# Patient Record
Sex: FEMALE | Race: BLACK OR AFRICAN AMERICAN | Employment: OTHER | URBAN - METROPOLITAN AREA
[De-identification: names, ages, dates, MRNs, and addresses within clinical notes are randomized per-mention and may not be internally consistent; named-entity substitution may affect disease eponyms.]

---

## 2017-03-04 ENCOUNTER — HOSPITAL ENCOUNTER (EMERGENCY)
Age: 62
Discharge: HOME | End: 2017-03-04
Attending: EMERGENCY MEDICINE
Payer: MEDICAID

## 2017-03-04 ENCOUNTER — APPOINTMENT (OUTPATIENT)
Dept: GENERAL RADIOLOGY | Age: 62
End: 2017-03-04
Attending: EMERGENCY MEDICINE
Payer: MEDICAID

## 2017-03-04 VITALS
BODY MASS INDEX: 22.44 KG/M2 | RESPIRATION RATE: 16 BRPM | HEART RATE: 59 BPM | OXYGEN SATURATION: 99 % | DIASTOLIC BLOOD PRESSURE: 82 MMHG | TEMPERATURE: 97.6 F | WEIGHT: 143 LBS | SYSTOLIC BLOOD PRESSURE: 174 MMHG | HEIGHT: 67 IN

## 2017-03-04 DIAGNOSIS — S92.504A CLOSED NONDISPLACED FRACTURE OF PHALANX OF LESSER TOE OF RIGHT FOOT, UNSPECIFIED PHALANX, INITIAL ENCOUNTER: ICD-10-CM

## 2017-03-04 DIAGNOSIS — G89.29 CHRONIC GENERALIZED ABDOMINAL PAIN: Primary | ICD-10-CM

## 2017-03-04 DIAGNOSIS — R10.84 CHRONIC GENERALIZED ABDOMINAL PAIN: Primary | ICD-10-CM

## 2017-03-04 LAB
ALBUMIN SERPL BCP-MCNC: 3.6 G/DL (ref 3.2–4.6)
ALBUMIN/GLOB SERPL: 0.7 {RATIO} (ref 1.2–3.5)
ALP SERPL-CCNC: 165 U/L (ref 50–136)
ALT SERPL-CCNC: 28 U/L (ref 12–65)
ANION GAP BLD CALC-SCNC: 6 MMOL/L (ref 7–16)
AST SERPL W P-5'-P-CCNC: 33 U/L (ref 15–37)
BACTERIA URNS QL MICRO: 0 /HPF
BASOPHILS # BLD AUTO: 0 K/UL (ref 0–0.2)
BASOPHILS # BLD: 0 % (ref 0–2)
BILIRUB SERPL-MCNC: 0.3 MG/DL (ref 0.2–1.1)
BUN SERPL-MCNC: 19 MG/DL (ref 8–23)
CALCIUM SERPL-MCNC: 9.5 MG/DL (ref 8.3–10.4)
CASTS URNS QL MICRO: NORMAL /LPF
CHLORIDE SERPL-SCNC: 108 MMOL/L (ref 98–107)
CO2 SERPL-SCNC: 29 MMOL/L (ref 21–32)
CREAT SERPL-MCNC: 1.65 MG/DL (ref 0.6–1)
DIFFERENTIAL METHOD BLD: ABNORMAL
EOSINOPHIL # BLD: 0.1 K/UL (ref 0–0.8)
EOSINOPHIL NFR BLD: 2 % (ref 0.5–7.8)
EPI CELLS #/AREA URNS HPF: NORMAL /HPF
ERYTHROCYTE [DISTWIDTH] IN BLOOD BY AUTOMATED COUNT: 16.6 % (ref 11.9–14.6)
GLOBULIN SER CALC-MCNC: 4.9 G/DL (ref 2.3–3.5)
GLUCOSE SERPL-MCNC: 145 MG/DL (ref 65–100)
HCT VFR BLD AUTO: 36.8 % (ref 35.8–46.3)
HGB BLD-MCNC: 11.5 G/DL (ref 11.7–15.4)
IMM GRANULOCYTES # BLD: 0 K/UL (ref 0–0.5)
IMM GRANULOCYTES NFR BLD AUTO: 0.2 % (ref 0–5)
LIPASE SERPL-CCNC: 128 U/L (ref 73–393)
LYMPHOCYTES # BLD AUTO: 33 % (ref 13–44)
LYMPHOCYTES # BLD: 2 K/UL (ref 0.5–4.6)
MAGNESIUM SERPL-MCNC: 2 MG/DL (ref 1.8–2.4)
MCH RBC QN AUTO: 22 PG (ref 26.1–32.9)
MCHC RBC AUTO-ENTMCNC: 31.3 G/DL (ref 31.4–35)
MCV RBC AUTO: 70.4 FL (ref 79.6–97.8)
MONOCYTES # BLD: 0.3 K/UL (ref 0.1–1.3)
MONOCYTES NFR BLD AUTO: 5 % (ref 4–12)
NEUTS SEG # BLD: 3.5 K/UL (ref 1.7–8.2)
NEUTS SEG NFR BLD AUTO: 60 % (ref 43–78)
PLATELET # BLD AUTO: 246 K/UL (ref 150–450)
PMV BLD AUTO: 9.5 FL (ref 10.8–14.1)
POTASSIUM SERPL-SCNC: 4.4 MMOL/L (ref 3.5–5.1)
PROT SERPL-MCNC: 8.5 G/DL (ref 6.3–8.2)
RBC # BLD AUTO: 5.23 M/UL (ref 4.05–5.25)
RBC #/AREA URNS HPF: 0 /HPF
SODIUM SERPL-SCNC: 143 MMOL/L (ref 136–145)
WBC # BLD AUTO: 5.9 K/UL (ref 4.3–11.1)
WBC URNS QL MICRO: NORMAL /HPF

## 2017-03-04 PROCEDURE — 81003 URINALYSIS AUTO W/O SCOPE: CPT | Performed by: EMERGENCY MEDICINE

## 2017-03-04 PROCEDURE — 96361 HYDRATE IV INFUSION ADD-ON: CPT | Performed by: EMERGENCY MEDICINE

## 2017-03-04 PROCEDURE — 81015 MICROSCOPIC EXAM OF URINE: CPT | Performed by: EMERGENCY MEDICINE

## 2017-03-04 PROCEDURE — 83735 ASSAY OF MAGNESIUM: CPT | Performed by: EMERGENCY MEDICINE

## 2017-03-04 PROCEDURE — 80053 COMPREHEN METABOLIC PANEL: CPT | Performed by: EMERGENCY MEDICINE

## 2017-03-04 PROCEDURE — 74011250636 HC RX REV CODE- 250/636: Performed by: EMERGENCY MEDICINE

## 2017-03-04 PROCEDURE — 96374 THER/PROPH/DIAG INJ IV PUSH: CPT | Performed by: EMERGENCY MEDICINE

## 2017-03-04 PROCEDURE — 99284 EMERGENCY DEPT VISIT MOD MDM: CPT | Performed by: EMERGENCY MEDICINE

## 2017-03-04 PROCEDURE — 74022 RADEX COMPL AQT ABD SERIES: CPT

## 2017-03-04 PROCEDURE — 83690 ASSAY OF LIPASE: CPT | Performed by: EMERGENCY MEDICINE

## 2017-03-04 PROCEDURE — 85025 COMPLETE CBC W/AUTO DIFF WBC: CPT | Performed by: EMERGENCY MEDICINE

## 2017-03-04 PROCEDURE — 74011250636 HC RX REV CODE- 250/636

## 2017-03-04 PROCEDURE — 96375 TX/PRO/DX INJ NEW DRUG ADDON: CPT | Performed by: EMERGENCY MEDICINE

## 2017-03-04 RX ORDER — ONDANSETRON 2 MG/ML
4 INJECTION INTRAMUSCULAR; INTRAVENOUS
Status: COMPLETED | OUTPATIENT
Start: 2017-03-04 | End: 2017-03-04

## 2017-03-04 RX ORDER — DIPHENHYDRAMINE HYDROCHLORIDE 50 MG/ML
25 INJECTION, SOLUTION INTRAMUSCULAR; INTRAVENOUS
Status: COMPLETED | OUTPATIENT
Start: 2017-03-04 | End: 2017-03-04

## 2017-03-04 RX ORDER — SUCRALFATE 1 G/10ML
1 SUSPENSION ORAL 4 TIMES DAILY
Qty: 414 ML | Refills: 1 | Status: SHIPPED | OUTPATIENT
Start: 2017-03-04 | End: 2022-04-28

## 2017-03-04 RX ORDER — DIPHENHYDRAMINE HYDROCHLORIDE 50 MG/ML
INJECTION, SOLUTION INTRAMUSCULAR; INTRAVENOUS
Status: COMPLETED
Start: 2017-03-04 | End: 2017-03-04

## 2017-03-04 RX ORDER — METOCLOPRAMIDE 5 MG/1
5 TABLET ORAL
Qty: 19 TAB | Refills: 0 | Status: SHIPPED | OUTPATIENT
Start: 2017-03-04 | End: 2017-03-14

## 2017-03-04 RX ADMIN — SODIUM CHLORIDE 1000 ML: 900 INJECTION, SOLUTION INTRAVENOUS at 17:33

## 2017-03-04 RX ADMIN — ONDANSETRON 4 MG: 2 INJECTION, SOLUTION INTRAMUSCULAR; INTRAVENOUS at 17:33

## 2017-03-04 RX ADMIN — DIPHENHYDRAMINE HYDROCHLORIDE 25 MG: 50 INJECTION, SOLUTION INTRAMUSCULAR; INTRAVENOUS at 17:45

## 2017-03-04 NOTE — ED TRIAGE NOTES
Reports continued right foot toe pain from a broken toe 4 weeks ago. Also reports abdominal pain that radiates into her back. States saw GI for it last month.

## 2017-03-04 NOTE — DISCHARGE INSTRUCTIONS
Abdominal Pain: Care Instructions  Your Care Instructions    Abdominal pain has many possible causes. Some aren't serious and get better on their own in a few days. Others need more testing and treatment. If your pain continues or gets worse, you need to be rechecked and may need more tests to find out what is wrong. You may need surgery to correct the problem. Don't ignore new symptoms, such as fever, nausea and vomiting, urination problems, pain that gets worse, and dizziness. These may be signs of a more serious problem. Your doctor may have recommended a follow-up visit in the next 8 to 12 hours. If you are not getting better, you may need more tests or treatment. The doctor has checked you carefully, but problems can develop later. If you notice any problems or new symptoms, get medical treatment right away. Follow-up care is a key part of your treatment and safety. Be sure to make and go to all appointments, and call your doctor if you are having problems. It's also a good idea to know your test results and keep a list of the medicines you take. How can you care for yourself at home? · Rest until you feel better. · To prevent dehydration, drink plenty of fluids, enough so that your urine is light yellow or clear like water. Choose water and other caffeine-free clear liquids until you feel better. If you have kidney, heart, or liver disease and have to limit fluids, talk with your doctor before you increase the amount of fluids you drink. · If your stomach is upset, eat mild foods, such as rice, dry toast or crackers, bananas, and applesauce. Try eating several small meals instead of two or three large ones. · Wait until 48 hours after all symptoms have gone away before you have spicy foods, alcohol, and drinks that contain caffeine. · Do not eat foods that are high in fat. · Avoid anti-inflammatory medicines such as aspirin, ibuprofen (Advil, Motrin), and naproxen (Aleve).  These can cause stomach upset. Talk to your doctor if you take daily aspirin for another health problem. When should you call for help? Call 911 anytime you think you may need emergency care. For example, call if:  · You passed out (lost consciousness). · You pass maroon or very bloody stools. · You vomit blood or what looks like coffee grounds. · You have new, severe belly pain. Call your doctor now or seek immediate medical care if:  · Your pain gets worse, especially if it becomes focused in one area of your belly. · You have a new or higher fever. · Your stools are black and look like tar, or they have streaks of blood. · You have unexpected vaginal bleeding. · You have symptoms of a urinary tract infection. These may include:  ¨ Pain when you urinate. ¨ Urinating more often than usual.  ¨ Blood in your urine. · You are dizzy or lightheaded, or you feel like you may faint. Watch closely for changes in your health, and be sure to contact your doctor if:  · You are not getting better after 1 day (24 hours). Where can you learn more? Go to http://brittany-mike.info/. Enter N176 in the search box to learn more about \"Abdominal Pain: Care Instructions. \"  Current as of: May 27, 2016  Content Version: 11.1  © 8765-3933 [employer]. Care instructions adapted under license by [employer] (which disclaims liability or warranty for this information). If you have questions about a medical condition or this instruction, always ask your healthcare professional. [place] [address] any warranty or liability for your use of this information. Broken Toe: Care Instructions  Your Care Instructions  You have broken (fractured) a bone in your toe. This kind of fracture does not need a special cast or brace. \"Gaurav-taping\" your broken toe to a healthy toe next to it is almost always enough to treat the problem and ease symptoms.  The toe may take 4 weeks or more to heal.  You heal best when you take good care of yourself. Eat a variety of healthy foods, and don't smoke. Follow-up care is a key part of your treatment and safety. Be sure to make and go to all appointments, and call your doctor if you are having problems. It's also a good idea to know your test results and keep a list of the medicines you take. How can you care for yourself at home? · Be safe with medicines. Take pain medicines exactly as directed. ¨ If the doctor gave you a prescription medicine for pain, take it as prescribed. ¨ If you are not taking a prescription pain medicine, ask your doctor if you can take an over-the-counter medicine. · If your toe is taped to the toe next to it, your doctor has shown you how to change the tape. Protect the skin by putting something soft, such as felt or foam, between your toes before you tape them together. Never tape the toes together skin-to-skin. Your broken toe may need to be laura-taped for 2 to 4 weeks to heal.  · Rest and protect your toe. Do not walk on it until you can do so without too much pain. If the doctor has told you to use crutches, use them as instructed. · Put ice or a cold pack on your toe for 10 to 20 minutes at a time. Try to do this every 1 to 2 hours for the next 3 days (when you are awake) or until the swelling goes down. Put a thin cloth between the ice and your skin. · Prop up your foot on a pillow when you ice it or anytime you sit or lie down. Try to keep it above the level of your heart. This will help reduce swelling. · Make sure you go to your follow-up appointments. Your doctor will need to check that your toe is healing right. When should you call for help? Call your doctor now or seek immediate medical care if:  · You have severe pain. · Your toe is cool or pale or changes color. · You have tingling, weakness, or numbness in your toe.   Watch closely for changes in your health, and be sure to contact your doctor if:  · Pain and swelling get worse or are not improving. · You have a new or worse deformity in your toe. Where can you learn more? Go to http://brittany-mike.info/. Enter S109 in the search box to learn more about \"Broken Toe: Care Instructions. \"  Current as of: May 23, 2016  Content Version: 11.1  © 7724-8183 [employer]. Care instructions adapted under license by [employer] (which disclaims liability or warranty for this information). If you have questions about a medical condition or this instruction, always ask your healthcare professional. [place] [address] any warranty or liability for your use of this information.

## 2017-03-04 NOTE — ED PROVIDER NOTES
HPI Comments: 26-year-old female presents with exacerbation of her chronic abdominal pain. Followed by her primary care doctor, as well as GI. No history of hepatitis C  Had ultrasound in the last 2 weeks which was essentially unremarkable. Patient also complains of pain in her right fourth toe. Apparently about 4 weeks of head injury was seen by her primary care doctor-facial fracture. She is placed in a walking shoe at that time. She states she has not had any follow-up since. She reports of the toes remained swollen and painful. Patient is a 57 y.o. female presenting with abdominal pain. The history is provided by the patient. Abdominal Pain    This is a recurrent problem. The current episode started more than 1 week ago. The problem occurs constantly. The problem has not changed since onset. The pain is associated with vomiting and laxative use. The pain is located in the generalized abdominal region. The pain is moderate. Associated symptoms include nausea, vomiting, constipation and arthralgias. Pertinent negatives include no fever, no diarrhea, no dysuria, no frequency, no hematuria, no headaches, no myalgias and no chest pain. The pain is worsened by eating and activity. The pain is relieved by nothing. Past workup includes CT scan, ultrasound. Past medical history comments: hepatitis C. Past Medical History:   Diagnosis Date    Arthritis     osteo    Cataract     left eye    Chronic kidney disease     renal calc.  Chronic pain     joint    Gastrointestinal disorder     ulcer    GERD (gastroesophageal reflux disease)     takes occassional OTC meds    Hypertension     controlled by meds    Kidney stones     Other ill-defined conditions(799.89)     \"has no sweat glands\", sickle  cell    sickle cell trait     last crisis 4/14, well controlled per pt.        Past Surgical History:   Procedure Laterality Date    HX CHOLECYSTECTOMY  2005    HX GI  [id number],[id number]    hiatal hernia x 2    HX HYSTERECTOMY  1999    MIGUEL ANGEL    HX OTHER SURGICAL      groin, buttocks, axilla- sweat glands    HX UROLOGICAL   2009x23/2010    cyto,stentx3         Family History:   Problem Relation Age of Onset    Diabetes Mother     Hypertension Mother     Hypertension Brother     Heart Disease Brother     Lung Disease Brother     Diabetes Brother     Heart Disease Sister     Hypertension Sister     Heart Disease Brother     Lung Disease Brother     Alcohol abuse Neg Hx     Allergy-severe Neg Hx     Anemia Neg Hx     Asthma Neg Hx     Cancer Neg Hx     Depression Neg Hx     Heart Attack Neg Hx     Kidney Disease Neg Hx     Mental Retardation Neg Hx     Rashes/Skin Problems Neg Hx     Malignant Hyperthermia Neg Hx     Pseudocholinesterase Deficiency Neg Hx     Delayed Awakening Neg Hx     Post-op Nausea/Vomiting Neg Hx     Emergence Delirium Neg Hx     Post-op Cognitive Dysfunction Neg Hx     Other Neg Hx        Social History     Social History    Marital status: SINGLE     Spouse name: N/A    Number of children: N/A    Years of education: N/A     Occupational History    Not on file. Social History Main Topics    Smoking status: Never Smoker    Smokeless tobacco: Never Used    Alcohol use No    Drug use: No    Sexual activity: Not Currently     Other Topics Concern    Not on file     Social History Narrative         ALLERGIES: Trazodone and Ultram [tramadol]    Review of Systems   Constitutional: Negative for chills and fever. HENT: Negative for congestion, ear pain and rhinorrhea. Eyes: Negative for photophobia and discharge. Respiratory: Negative for cough and shortness of breath. Cardiovascular: Negative for chest pain and palpitations. Gastrointestinal: Positive for abdominal pain, constipation, nausea and vomiting. Negative for diarrhea. Endocrine: Negative for cold intolerance and heat intolerance.    Genitourinary: Negative for dysuria, flank pain, frequency and hematuria. Musculoskeletal: Positive for arthralgias. Negative for myalgias and neck pain. Skin: Negative for rash and wound. Allergic/Immunologic: Negative for environmental allergies and food allergies. Neurological: Negative for syncope and headaches. Hematological: Negative for adenopathy. Does not bruise/bleed easily. Psychiatric/Behavioral: Positive for dysphoric mood. The patient is not nervous/anxious. All other systems reviewed and are negative. Vitals:    03/04/17 1640   BP: (!) 184/92   Pulse: 67   Resp: 18   Temp: 97.7 °F (36.5 °C)   SpO2: 100%   Weight: 64.9 kg (143 lb)   Height: 5' 7\" (1.702 m)            Physical Exam   Constitutional: She is oriented to person, place, and time. She appears well-developed and well-nourished. She appears distressed. HENT:   Head: Normocephalic and atraumatic. Mouth/Throat: Oropharynx is clear and moist.   Eyes: Conjunctivae and EOM are normal. Pupils are equal, round, and reactive to light. Right eye exhibits no discharge. Left eye exhibits no discharge. No scleral icterus. Neck: Normal range of motion. Neck supple. No thyromegaly present. Cardiovascular: Normal rate, regular rhythm, normal heart sounds and intact distal pulses. Exam reveals no gallop and no friction rub. No murmur heard. Pulmonary/Chest: Effort normal and breath sounds normal. No respiratory distress. She has no wheezes. She exhibits no tenderness. Abdominal: Soft. Bowel sounds are normal. She exhibits no distension. There is no hepatosplenomegaly. There is tenderness. There is no rebound and no guarding. No hernia. Diffuse mild tenderness to palpation. No mass. No percussive tenderness. Musculoskeletal: Normal range of motion. She exhibits no edema or tenderness. Feet:    Neurological: She is alert and oriented to person, place, and time. No cranial nerve deficit. She exhibits normal muscle tone. Skin: Skin is warm. No rash noted.  She is not diaphoretic. No erythema. Psychiatric: She has a normal mood and affect. Her behavior is normal. Judgment and thought content normal.   Nursing note and vitals reviewed. MDM  Number of Diagnoses or Management Options  Chronic generalized abdominal pain: established and worsening  Closed nondisplaced fracture of phalanx of lesser toe of right foot, unspecified phalanx, initial encounter: established and worsening  Diagnosis management comments: 1)  Recurrent abdominal pain =- likely related to her chronic hepatitis C. We will recheck lab work and some plain films as a patient as had a recent ultrasound which was unremarkable    2)  . Right fourth toe pain - patient is partially 4 weeks out from a known fracture. She is in an appropriate splint at this time. I will encourage her to follow-up with orthopedics. Given the fact she still having persistent pain. [facility] records are reviewed. The patient gets oxycodone 15 mg 1 4 times a day from her primary care provider as well as a benzodiazepine. Both prescriptions should be current. Amount and/or Complexity of Data Reviewed  Clinical lab tests: ordered and reviewed  Tests in the radiology section of CPT®: ordered and reviewed  Tests in the medicine section of CPT®: ordered and reviewed  Review and summarize past medical records: yes    Risk of Complications, Morbidity, and/or Mortality  Presenting problems: moderate  Diagnostic procedures: moderate  Management options: low  General comments: Elements of this note have been dictated via voice recognition software. Text and phrases may be limited by the accuracy of the software. The chart has been reviewed, but errors may still be present.       Patient Progress  Patient progress: stable    ED Course       Procedures

## 2017-03-05 NOTE — ED NOTES
I have reviewed discharge instructions with the patient. The patient verbalized understanding. Prescriptions given to pt. Patient denies any further needs, questions, or concerns at this time. No adverse reactions to any treatments, meds, or procedures noted. Pt ambulatory with steady gait to POV with spouse. Pt signed hard copy d/c form.

## 2021-07-20 ENCOUNTER — HOSPITAL ENCOUNTER (EMERGENCY)
Age: 66
Discharge: HOME | End: 2021-07-21
Attending: EMERGENCY MEDICINE
Payer: MEDICARE

## 2021-07-20 VITALS
DIASTOLIC BLOOD PRESSURE: 90 MMHG | TEMPERATURE: 98.8 F | SYSTOLIC BLOOD PRESSURE: 165 MMHG | RESPIRATION RATE: 16 BRPM | HEART RATE: 57 BPM | BODY MASS INDEX: 23.18 KG/M2 | OXYGEN SATURATION: 99 % | WEIGHT: 148 LBS

## 2021-07-20 DIAGNOSIS — N39.0 URINARY TRACT INFECTION WITHOUT HEMATURIA, SITE UNSPECIFIED: Primary | ICD-10-CM

## 2021-07-20 PROCEDURE — 93005 ELECTROCARDIOGRAM TRACING: CPT | Performed by: EMERGENCY MEDICINE

## 2021-07-20 PROCEDURE — 74011250637 HC RX REV CODE- 250/637: Performed by: PHYSICIAN ASSISTANT

## 2021-07-20 PROCEDURE — 81003 URINALYSIS AUTO W/O SCOPE: CPT

## 2021-07-20 PROCEDURE — 99285 EMERGENCY DEPT VISIT HI MDM: CPT

## 2021-07-20 RX ORDER — CEPHALEXIN 500 MG/1
500 CAPSULE ORAL 3 TIMES DAILY
Qty: 21 CAPSULE | Refills: 0 | Status: SHIPPED | OUTPATIENT
Start: 2021-07-20 | End: 2021-07-27

## 2021-07-20 RX ORDER — CEPHALEXIN 500 MG/1
500 CAPSULE ORAL
Status: COMPLETED | OUTPATIENT
Start: 2021-07-20 | End: 2021-07-20

## 2021-07-20 RX ADMIN — CEPHALEXIN 500 MG: 500 CAPSULE ORAL at 23:55

## 2021-07-21 LAB
ATRIAL RATE: 54 BPM
CALCULATED P AXIS, ECG09: 47 DEGREES
CALCULATED R AXIS, ECG10: 7 DEGREES
CALCULATED T AXIS, ECG11: 37 DEGREES
DIAGNOSIS, 93000: NORMAL
P-R INTERVAL, ECG05: 146 MS
Q-T INTERVAL, ECG07: 412 MS
QRS DURATION, ECG06: 76 MS
QTC CALCULATION (BEZET), ECG08: 390 MS
VENTRICULAR RATE, ECG03: 54 BPM

## 2021-07-21 NOTE — ED TRIAGE NOTES
Pt states she has been falling for the last 2 weeks and feeling bad not getting out of bed.  States she  Has not been eating

## 2021-07-21 NOTE — ED NOTES
I have reviewed discharge instructions with the patient. The patient verbalized understanding. Patient left ED via Discharge Method: ambulatory to Home with family/friend. Opportunity for questions and clarification provided. Patient given 1 script. To continue your aftercare when you leave the hospital, you may receive an automated call from our care team to check in on how you are doing. This is a free service and part of our promise to provide the best care and service to meet your aftercare needs.  If you have questions, or wish to unsubscribe from this service please call [phone]. Thank you for Choosing our [facility] Emergency Department.

## 2021-07-21 NOTE — ED PROVIDER NOTES
68-year-old female presents with symptoms of just not feeling like herself. She states that she feels tired and she has fallen several times in the past few weeks. She states that she doesn't really know what is wrong she was coming to the hospital to bring a friend to the hospital and she thought she get seen and checked out to. States that she doesn't want to have any blood drawn for her being such a hard stick. She denies any fever denies any nausea vomiting. She does state that she has have some chills from time to time. Denies any dysuria or vaginal discharge. Bladder Infection   This is a new problem. The problem has not changed since onset. The quality of the pain is described as aching. The pain is at a severity of 4/10. The pain is mild. There has been no fever. Associated symptoms include chills. Pertinent negatives include no sweats, no nausea, no vomiting, no discharge, no frequency, no hematuria, no hesitancy, no possible pregnancy and no urgency. She has tried nothing for the symptoms. Her past medical history does not include kidney stones or urological procedure. Past Medical History:   Diagnosis Date    Arthritis     osteo    Cataract     left eye    Chronic kidney disease     renal calc.  Chronic pain     joint    Gastrointestinal disorder     ulcer    GERD (gastroesophageal reflux disease)     takes occassional OTC meds    Hypertension     controlled by meds    Kidney stones     Other ill-defined conditions(799.89)     \"has no sweat glands\", sickle  cell    sickle cell trait     last crisis 4/14, well controlled per pt.        Past Surgical History:   Procedure Laterality Date    HX CHOLECYSTECTOMY  2005    HX GI  2005,2006    hiatal hernia x 2    HX HYSTERECTOMY  1999    MIGUEL ANGEL    HX OTHER SURGICAL      groin, buttocks, axilla- sweat glands    HX UROLOGICAL   2009x23/2010    cyto,stentx3         Family History:   Problem Relation Age of Onset    Diabetes Mother    Lidia Nunn Hypertension Mother     Hypertension Brother     Heart Disease Brother     Lung Disease Brother     Diabetes Brother     Heart Disease Sister     Hypertension Sister     Heart Disease Brother     Lung Disease Brother     Alcohol abuse Neg Hx     Allergy-severe Neg Hx     Anemia Neg Hx     Asthma Neg Hx     Cancer Neg Hx     Depression Neg Hx     Heart Attack Neg Hx     Kidney Disease Neg Hx     Mental Retardation Neg Hx     Rashes/Skin Problems Neg Hx     Malignant Hyperthermia Neg Hx     Pseudocholinesterase Deficiency Neg Hx     Delayed Awakening Neg Hx     Post-op Nausea/Vomiting Neg Hx     Emergence Delirium Neg Hx     Post-op Cognitive Dysfunction Neg Hx     Other Neg Hx        Social History     Socioeconomic History    Marital status: SINGLE     Spouse name: Not on file    Number of children: Not on file    Years of education: Not on file    Highest education level: Not on file   Occupational History    Not on file   Tobacco Use    Smoking status: Never Smoker    Smokeless tobacco: Never Used   Substance and Sexual Activity    Alcohol use: No    Drug use: No    Sexual activity: Not Currently   Other Topics Concern    Not on file   Social History Narrative    Not on file     Social Determinants of Health     Financial Resource Strain:     Difficulty of Paying Living Expenses:    Food Insecurity:     Worried About Running Out of Food in the Last Year:     Ran Out of Food in the Last Year:    Transportation Needs:     Lack of Transportation (Medical):      Lack of Transportation (Non-Medical):    Physical Activity:     Days of Exercise per Week:     Minutes of Exercise per Session:    Stress:     Feeling of Stress :    Social Connections:     Frequency of Communication with Friends and Family:     Frequency of Social Gatherings with Friends and Family:     Attends Druze Services:     Active Member of Clubs or Organizations:     Attends Club or Organization Meetings:     Marital Status:    Intimate Partner Violence:     Fear of Current or Ex-Partner:     Emotionally Abused:     Physically Abused:     Sexually Abused: ALLERGIES: Trazodone and Ultram [tramadol]    Review of Systems   Constitutional: Positive for chills. Negative for activity change, appetite change and fever. HENT: Negative. Respiratory: Negative. Negative for cough and shortness of breath. Cardiovascular: Negative. Gastrointestinal: Negative. Negative for nausea and vomiting. Genitourinary: Negative. Negative for frequency, hematuria, hesitancy and urgency. Musculoskeletal: Negative. Negative for gait problem. Neurological: Negative. All other systems reviewed and are negative. Vitals:    07/20/21 2258 07/20/21 2352 07/20/21 2355 07/20/21 2356   BP: (!) 173/91  (!) 165/90    Pulse: (!) 52 65 (!) 57 (!) 57   Resp:       Temp:       SpO2: 99% 98% 98% 99%   Weight:                Physical Exam  Vitals and nursing note reviewed. Constitutional:       General: She is not in acute distress. Appearance: Normal appearance. She is not ill-appearing, toxic-appearing or diaphoretic. HENT:      Head: Normocephalic and atraumatic. Eyes:      Conjunctiva/sclera: Conjunctivae normal.   Cardiovascular:      Rate and Rhythm: Normal rate and regular rhythm. Pulses: Normal pulses. Heart sounds: Normal heart sounds. Pulmonary:      Effort: Pulmonary effort is normal. No respiratory distress. Breath sounds: Normal breath sounds and air entry. No stridor, decreased air movement or transmitted upper airway sounds. No decreased breath sounds, wheezing, rhonchi or rales. Chest:      Chest wall: No tenderness. Skin:     General: Skin is warm and dry. Neurological:      General: No focal deficit present. Mental Status: She is alert and oriented to person, place, and time. Mental status is at baseline.           MDM  Number of Diagnoses or Management Options  Urinary tract infection without hematuria, site unspecified: new and requires workup  Diagnosis management comments: Urinary tract infection on labs. Patient stable discharge home with antibiotics.        Amount and/or Complexity of Data Reviewed  Clinical lab tests: ordered and reviewed    Risk of Complications, Morbidity, and/or Mortality  Presenting problems: moderate  Diagnostic procedures: low  Management options: low    Patient Progress  Patient progress: stable         Procedures

## 2022-04-27 ENCOUNTER — HOSPITAL ENCOUNTER (EMERGENCY)
Age: 67
Discharge: HOME | End: 2022-04-27
Attending: EMERGENCY MEDICINE
Payer: MEDICARE

## 2022-04-27 VITALS
HEART RATE: 63 BPM | OXYGEN SATURATION: 99 % | RESPIRATION RATE: 18 BRPM | WEIGHT: 156 LBS | SYSTOLIC BLOOD PRESSURE: 156 MMHG | TEMPERATURE: 98 F | BODY MASS INDEX: 24.43 KG/M2 | DIASTOLIC BLOOD PRESSURE: 94 MMHG

## 2022-04-27 DIAGNOSIS — R23.4 SKIN FISSURE: ICD-10-CM

## 2022-04-27 DIAGNOSIS — L73.2 HIDRADENITIS SUPPURATIVA: Primary | ICD-10-CM

## 2022-04-27 PROCEDURE — 99283 EMERGENCY DEPT VISIT LOW MDM: CPT

## 2022-04-27 PROCEDURE — 74011250637 HC RX REV CODE- 250/637: Performed by: EMERGENCY MEDICINE

## 2022-04-27 RX ORDER — MUPIROCIN 20 MG/G
OINTMENT TOPICAL
Qty: 22 G | Refills: 0 | Status: SHIPPED | OUTPATIENT
Start: 2022-04-27

## 2022-04-27 RX ORDER — MUPIROCIN 20 MG/G
OINTMENT TOPICAL
Status: COMPLETED | OUTPATIENT
Start: 2022-04-27 | End: 2022-04-27

## 2022-04-27 RX ADMIN — MUPIROCIN: 20 OINTMENT TOPICAL at 02:38

## 2022-04-27 NOTE — DISCHARGE INSTRUCTIONS
Schedule close follow-up primary care physician. Use Bactroban as directed and follow-up with wound care. Keep site clean. Return if symptoms worsen or progress in any way.

## 2022-04-27 NOTE — ED TRIAGE NOTES
Patient states that she has two open wounds on her abdomen and rectum. States that she has had the pain for 4-5 days. States history of same.

## 2022-04-27 NOTE — ED NOTES
I have reviewed discharge instructions with the patient. The patient verbalized understanding. Patient left ED via Discharge Method: ambulatory to Home with . Opportunity for questions and clarification provided. Patient given 1 scripts. To continue your aftercare when you leave the hospital, you may receive an automated call from our care team to check in on how you are doing. This is a free service and part of our promise to provide the best care and service to meet your aftercare needs.  If you have questions, or wish to unsubscribe from this service please call [phone]. Thank you for Choosing our [address] [place] [address] Emergency Department.

## 2022-04-27 NOTE — ED PROVIDER NOTES
58-year-old female with past medical history of hidradenitis suppurativa, lichen planus, pure ego nodularis, chronic hepatitis C who presents with complaint recurrent upper gluteal cleft wound status post pilonidal cyst extension well as periumbilical wound at been present for many years. States recently she has noticed skin cracking to her periumbilical wound. States that she previously applied Bactroban ointment to site but has not been applying this recently. States that she has not followed up to wound care with primary care physician in regards to this issue recently. Denies fever, chills, nausea, vomiting, drainage from site, abdominal pain, dizziness, urinary symptoms. The history is provided by the patient. No  was used. Skin Problem   This is a chronic problem. The current episode started more than 1 week ago. The problem has not changed since onset. The problem is associated with nothing. There has been no fever. The pain is at a severity of 1/10. The pain is mild. The pain has been constant since onset. Associated symptoms include itching. She has tried nothing for the symptoms. The treatment provided no relief. Past Medical History:   Diagnosis Date    Arthritis     osteo    Cataract     left eye    Chronic kidney disease     renal calc.  Chronic pain     joint    Gastrointestinal disorder     ulcer    GERD (gastroesophageal reflux disease)     takes occassional OTC meds    Hypertension     controlled by meds    Kidney stones     Other ill-defined conditions(279.89)     \"has no sweat glands\", sickle  cell    sickle cell trait     last crisis 4/14, well controlled per pt.        Past Surgical History:   Procedure Laterality Date    HX CHOLECYSTECTOMY  2005    HX GI  2005,2006    hiatal hernia x 2    HX HYSTERECTOMY  1999    MIGUEL ANGEL    HX OTHER SURGICAL      groin, buttocks, axilla- sweat glands    HX UROLOGICAL   2009x23/2010    cyto,stentx3         Family History:   Problem Relation Age of Onset    Diabetes Mother     Hypertension Mother     Hypertension Brother     Heart Disease Brother     Lung Disease Brother     Diabetes Brother     Heart Disease Sister     Hypertension Sister     Heart Disease Brother     Lung Disease Brother     Alcohol abuse Neg Hx     Allergy-severe Neg Hx     Anemia Neg Hx     Asthma Neg Hx     Cancer Neg Hx     Depression Neg Hx     Heart Attack Neg Hx     Kidney Disease Neg Hx     Mental Retardation Neg Hx     Rashes/Skin Problems Neg Hx     Malignant Hyperthermia Neg Hx     Pseudocholinesterase Deficiency Neg Hx     Delayed Awakening Neg Hx     Post-op Nausea/Vomiting Neg Hx     Emergence Delirium Neg Hx     Post-op Cognitive Dysfunction Neg Hx     Other Neg Hx        Social History     Socioeconomic History    Marital status: SINGLE     Spouse name: Not on file    Number of children: Not on file    Years of education: Not on file    Highest education level: Not on file   Occupational History    Not on file   Tobacco Use    Smoking status: Never Smoker    Smokeless tobacco: Never Used   Substance and Sexual Activity    Alcohol use: No    Drug use: No    Sexual activity: Not Currently   Other Topics Concern    Not on file   Social History Narrative    Not on file     Social Determinants of Health     Financial Resource Strain:     Difficulty of Paying Living Expenses: Not on file   Food Insecurity:     Worried About Running Out of Food in the Last Year: Not on file    Deondre of Food in the Last Year: Not on file   Transportation Needs:     Lack of Transportation (Medical): Not on file    Lack of Transportation (Non-Medical):  Not on file   Physical Activity:     Days of Exercise per Week: Not on file    Minutes of Exercise per Session: Not on file   Stress:     Feeling of Stress : Not on file   Social Connections:     Frequency of Communication with Friends and Family: Not on file    Frequency of Social Gatherings with Friends and Family: Not on file    Attends Denominational Services: Not on file    Active Member of Clubs or Organizations: Not on file    Attends Club or Organization Meetings: Not on file    Marital Status: Not on file   Intimate Partner Violence:     Fear of Current or Ex-Partner: Not on file    Emotionally Abused: Not on file    Physically Abused: Not on file    Sexually Abused: Not on file   Housing Stability:     Unable to Pay for Housing in the Last Year: Not on file    Number of [place] in the Last Year: Not on file    Unstable Housing in the Last Year: Not on file         ALLERGIES: Trazodone and Ultram [tramadol]    Review of Systems   Constitutional: Negative for chills, fatigue and fever. HENT: Negative for congestion and sore throat. Respiratory: Negative for cough and shortness of breath. Cardiovascular: Negative for chest pain. Gastrointestinal: Negative for abdominal pain, diarrhea, nausea and vomiting. Genitourinary: Negative for dysuria, genital sores and hematuria. Musculoskeletal: Negative for arthralgias and myalgias. Skin: Positive for itching, rash and wound. Neurological: Negative for dizziness, weakness, light-headedness and headaches. Hematological: Does not bruise/bleed easily. Vitals:    04/27/22 0145   BP: (!) 156/94   Pulse: 63   Resp: 18   Temp: 98 °F (36.7 °C)   SpO2: 99%   Weight: 70.8 kg (156 lb)            Physical Exam  Vitals and nursing note reviewed. Exam conducted with a chaperone present. Constitutional:       Appearance: Normal appearance. HENT:      Head: Normocephalic. Nose: Nose normal.      Mouth/Throat:      Mouth: Mucous membranes are moist.      Pharynx: Oropharynx is clear. No oropharyngeal exudate or posterior oropharyngeal erythema. Eyes:      Extraocular Movements: Extraocular movements intact. Pupils: Pupils are equal, round, and reactive to light.    Cardiovascular:      Rate and Rhythm: Normal rate. Pulses: Normal pulses. Heart sounds: Normal heart sounds. Pulmonary:      Effort: Pulmonary effort is normal.      Breath sounds: Normal breath sounds. Abdominal:      General: Bowel sounds are normal.      Palpations: Abdomen is soft. Tenderness: There is no abdominal tenderness. There is no right CVA tenderness, left CVA tenderness, guarding or rebound. Comments: Soft, nontender, nondistended. No rebound or guarding. Chronic periumbilical wound with fissures noted. No purulent drainage; see image below. Genitourinary:     Comments: Chronic-appearing midline sacral wound; wound bed w/ healthy granulation tissue. No surrounding erythema or evidence of cellulitis. No purulent drainage. No findings concerning for necrotizing fasciitis. Musculoskeletal:         General: Normal range of motion. Cervical back: Normal range of motion. Skin:     General: Skin is warm. Findings: Rash present. Neurological:      General: No focal deficit present. Mental Status: She is alert and oriented to person, place, and time. Cranial Nerves: No cranial nerve deficit. Sensory: Sensory deficit present. Motor: No weakness. MDM  Number of Diagnoses or Management Options  Hidradenitis suppurativa  Skin fissure: minor  Diagnosis management comments: Patient with chronic appearing wounds. Patient states that she previously applied Bactroban to wounds and it helped significantly. Will apply wounds and discharge home with ointment with instructions to follow-up with wound care, PCP.   Patient given return precautions       Amount and/or Complexity of Data Reviewed  Tests in the medicine section of CPT®: reviewed and ordered  Review and summarize past medical records: yes    Risk of Complications, Morbidity, and/or Mortality  Presenting problems: low  Diagnostic procedures: low  Management options: low    Patient Progress  Patient progress: stable         Procedures        Leata Brittle., MD; 4/27/2022 @4:14 AM Voice dictation software was used during the making of this note. This software is not perfect and grammatical and other typographical errors may be present.   This note has not been proofread for errors.  ===================================================================

## 2022-04-28 ENCOUNTER — HOSPITAL ENCOUNTER (OUTPATIENT)
Dept: WOUND CARE | Age: 67
Discharge: HOME | End: 2022-04-28
Attending: SURGERY

## 2022-04-28 VITALS
WEIGHT: 149 LBS | HEIGHT: 67 IN | DIASTOLIC BLOOD PRESSURE: 79 MMHG | OXYGEN SATURATION: 100 % | TEMPERATURE: 97.9 F | BODY MASS INDEX: 23.39 KG/M2 | RESPIRATION RATE: 18 BRPM | HEART RATE: 56 BPM | SYSTOLIC BLOOD PRESSURE: 132 MMHG

## 2022-04-28 DIAGNOSIS — L30.9 CHRONIC DERMATITIS: ICD-10-CM

## 2022-04-28 DIAGNOSIS — L98.8 PILONIDAL DISEASE: ICD-10-CM

## 2022-04-28 PROCEDURE — 99204 OFFICE O/P NEW MOD 45 MIN: CPT | Performed by: SURGERY

## 2022-04-28 RX ORDER — ALBUTEROL SULFATE 0.83 MG/ML
SOLUTION RESPIRATORY (INHALATION)
COMMUNITY

## 2022-04-28 NOTE — WOUND CARE
04/28/22 1539   Wound Abdomen #1 mika-umbilical 55/66/92   Date First Assessed/Time First Assessed: 04/28/22 1507   Present on Hospital Admission: Yes  Wound Approximate Age at First Assessment (Weeks): (c) 6 weeks  Primary Wound Type: Moisture Assoc. Skin Damage  Location: Abdomen  Wound Description: #1 mika. .. Wound Image    Wound Etiology Other (Comment)  (Fungal - MASD)   Dressing Status Clean   Cleansed Cleansed with saline   Dressing/Treatment   (Mupirocin, Gauze Strip)   Wound Length (cm) 1 cm   Wound Width (cm) 7.1 cm   Wound Depth (cm) 0.2 cm   Wound Surface Area (cm^2) 7.1 cm^2   Wound Volume (cm^3) 1.42 cm^3   Drainage Amount Small   Drainage Description Serosanguinous   Wound Odor None   Mika-Wound/Incision Assessment Fragile   Edges Attached edges   Wound Thickness Description Full thickness   Wound Gluteal fold/cleft Proximal #2 Pilonidal  04/28/22   Date First Assessed/Time First Assessed: 04/28/22 1510   Present on Hospital Admission: Yes  Wound Approximate Age at First Assessment (Weeks): (c)   Primary Wound Type: Other (comment)  Location: Gluteal fold/cleft  Wound Location Orientation: Proxim. .. Wound Image    Wound Etiology   (Pilonidal Cyst surgery 1980 then again 2008)   Dressing Status Old drainage noted   Cleansed Cleansed with saline   Dressing/Treatment   (Mupirocin, Gauze Strip)   Wound Length (cm) 6 cm   Wound Width (cm) 1.1 cm   Wound Depth (cm) 0.2 cm   Wound Surface Area (cm^2) 6.6 cm^2   Wound Volume (cm^3) 1.32 cm^3   Wound Assessment Pink/red   Drainage Amount Moderate   Drainage Description Serosanguinous   Wound Odor None   Mika-Wound/Incision Assessment Intact   Edges Attached edges   Wound Thickness Description Full thickness   Wound Gluteal fold/cleft Distal #3 Pilonidal 04/28/22   Date First Assessed/Time First Assessed: 04/28/22 1543   Present on Hospital Admission: Yes  Wound Approximate Age at First Assessment (Weeks): (c)   Primary Wound Type:  Other (comment) Location: Gluteal fold/cleft  Wound Location Orientation: Distal... Wound Image    Wound Etiology   (Pilonidal Cyst surgery 1980 then again 2008)   Dressing Status Old drainage noted   Cleansed Cleansed with saline   Dressing/Treatment   (Mupirocin, Gauze Strip)   Wound Length (cm) 2 cm   Wound Width (cm) 3 cm   Wound Depth (cm) 0.2 cm   Wound Surface Area (cm^2) 6 cm^2   Wound Volume (cm^3) 1.2 cm^3   Wound Assessment Pink/red   Drainage Amount Moderate   Drainage Description Serosanguinous   Wound Odor None   Carri-Wound/Incision Assessment Intact   Edges Attached edges   Wound Thickness Description Full thickness     Wounds mechanically debrided with gauze and normal saline.

## 2022-04-28 NOTE — WOUND CARE
[address]  [address], [address]  Phone: [phone]  Fax: [phone]    Patient: Amanda Ruiz MRN: [id number]  SSN: [id number]    YOB: 1955  Age: 69 y.o. Sex: female       Return Appointment: 1 week with Elvira Ruth MD    Instructions: Abdominal (Carri-Umbilical) Wound:(Fungal)  Nizoral Shampoo - wash entire body. Follow Prescription instructions. May drape Dry Gauze across wound for protection. Pilonidal Surgical Wounds/Gluteal Fold Wounds:  Cleanse with Normal Saline  Moisture Barrier to periwound  Aquacel Ag (strip) to wound bed. Cover with pad (may use ABD surgical pad or feminine hygiene pad)  Secure in place with undergarment. Dressing change daily. Should you experience increased redness, swelling, pain, foul odor, size of wound(s), or have a temperature over 101 degrees please contact the [address] [place] [address] Road at [phone] or if after hours contact your primary care physician or go to the hospital emergency department.     Signed By: Nael Agudelo RN     April 28, 2022

## 2022-04-28 NOTE — WOUND CARE
04/28/22 1539   Wound Abdomen #1 mika-umbilical 26/76/97   Date First Assessed/Time First Assessed: 04/28/22 1507   Present on Hospital Admission: Yes  Wound Approximate Age at First Assessment (Weeks): (c) 6 weeks  Primary Wound Type: Moisture Assoc. Skin Damage  Location: Abdomen  Wound Description: #1 mika. .. Wound Image    Wound Etiology Other (Comment)  (Hydradenitis)   Dressing Status Clean   Cleansed Cleansed with saline   Dressing/Treatment   (Mupirocin, Gauze Strip)   Wound Length (cm) 1 cm   Wound Width (cm) 7.1 cm   Wound Depth (cm) 0.2 cm   Wound Surface Area (cm^2) 7.1 cm^2   Wound Volume (cm^3) 1.42 cm^3   Drainage Amount Small   Drainage Description Serosanguinous   Wound Odor None   Mika-Wound/Incision Assessment Fragile   Edges Attached edges   Wound Thickness Description Full thickness   Wound Gluteal fold/cleft Proximal #2 Hydradenitits 04/28/22   Date First Assessed/Time First Assessed: 04/28/22 1510   Present on Hospital Admission: Yes  Wound Approximate Age at First Assessment (Weeks): (c)   Primary Wound Type: Other (comment)  Location: Gluteal fold/cleft  Wound Location Orientation: Proxim. .. Wound Image    Wound Etiology   (Hydradenitis)   Dressing Status Old drainage noted   Cleansed Cleansed with saline   Dressing/Treatment   (Mupirocin, Gauze Strip)   Wound Length (cm) 6 cm   Wound Width (cm) 1.1 cm   Wound Depth (cm) 0.2 cm   Wound Surface Area (cm^2) 6.6 cm^2   Wound Volume (cm^3) 1.32 cm^3   Wound Assessment Pink/red   Drainage Amount Moderate   Drainage Description Serosanguinous   Wound Odor None   Mika-Wound/Incision Assessment Intact   Edges Attached edges   Wound Thickness Description Full thickness   Wound Gluteal fold/cleft Distal #3 Hydradenitis 04/28/22   Date First Assessed/Time First Assessed: 04/28/22 1543   Present on Hospital Admission: Yes  Wound Approximate Age at First Assessment (Weeks): (c)   Primary Wound Type:  Other (comment)  Location: Gluteal fold/cleft Wound Location Orientation: Distal... Wound Image    Wound Etiology   (Hydradenitis)   Dressing Status Old drainage noted   Cleansed Cleansed with saline   Dressing/Treatment   (Mupirocin, Gauze Strip)   Wound Length (cm) 2 cm   Wound Width (cm) 3 cm   Wound Depth (cm) 0.2 cm   Wound Surface Area (cm^2) 6 cm^2   Wound Volume (cm^3) 1.2 cm^3   Wound Assessment Pink/red   Drainage Amount Moderate   Drainage Description Serosanguinous   Wound Odor None   Carri-Wound/Incision Assessment Intact   Edges Attached edges   Wound Thickness Description Full thickness     Patient is not on ANTICOAGULANT. Wounds mechanically debrided with gauze and normal saline.

## 2022-04-28 NOTE — DISCHARGE INSTRUCTIONS
[address]  [address], [address]  Phone: [phone]  Fax: [phone]    Patient: Ge Boss MRN: [id number]  SSN: [id number]    YOB: 1955  Age: 69 y.o. Sex: female       Return Appointment: 1 week with Duane Boxer, MD    Instructions: Abdominal (Carri-Umbilical) Wound:(Fungal)  Nizoral Shampoo - wash entire body. Follow Prescription instructions. May drape Dry Gauze across wound for protection. Pilonidal Surgical Wounds/Gluteal Fold Wounds:  Cleanse with Normal Saline  Moisture Barrier to periwound  Aquacel Ag (strip) to wound bed. Cover with pad (may use ABD surgical pad or feminine hygiene pad)  Secure in place with undergarment. Dressing change daily. Should you experience increased redness, swelling, pain, foul odor, size of wound(s), or have a temperature over 101 degrees please contact the [address] [place] [address] Road at [phone] or if after hours contact your primary care physician or go to the hospital emergency department.     Signed By: Roberta Barthel, RN     April 28, 2022

## 2022-04-29 NOTE — PROGRESS NOTES
[address] [facility] [address]    [address] [place], [place]  Consult Note/H&P/Progress Note      [address] RECORD NUMBER:  [id number]  AGE: 69 y.o. RACE BLACK/  GENDER: female  : 1955  EPISODE DATE:  2022       Subjective:      CC (Chief Complaint) and HISTORY of PRESENT ILLNESS (HPI):    Nereyda Zarate is a 69 y.o. female who presents today for wound/ulcer evaluation. She was seen in the ER 1 day prior to her initial visit to the wound center on 2022. She was noted to have open wounds from prior pilonidal cystectomy performed by Dr. Liz Reinoso approximately 20 years ago. She has chronic wounds that open and close with some response to Bactroban. She also has a chronic periumbilical rash that has developed cracks. She also treats this with Bactroban. She washes with Dial soap. She has a history of hidradenitis of both axilla which were treated surgically. She is currently and has been permanently disabled for this condition. She also has a history of hepatitis C that has been treated and eradicated. She is never smoker. She is a never drinker. This information was obtained from the patient  The following HPI elements were documented for the patient's wound:  Location: Gluteal cleft, periumbilical  Duration: Chronic for many years  Severity: Waxing and waning severity  Context: Surgically treated pilonidal disease, periumbilical rash  Modifying Factors:  Nothing makes better or worse  Quality: Painful  Timing: Often  Associated Signs and Symptoms: Skin breakdown, irritation, open wounds      Ulcer Identification:  Ulcer Type: non-healing surgical, undetermined, pilonidal cyst and Rash    Contributing Factors: Deep cleft, overtreatment with antibacterials            PAST MEDICAL HISTORY  Past Medical History:   Diagnosis Date    Arthritis     osteo    Cataract     left eye    Chronic kidney disease     renal calc.     Chronic pain     joint    Gastrointestinal disorder     ulcer    GERD (gastroesophageal reflux disease)     takes occassional OTC meds    Hypertension     controlled by meds    Kidney stones     Other ill-defined conditions(089.89)     \"has no sweat glands\", sickle  cell    sickle cell trait     last crisis 4/14, well controlled per pt. PAST SURGICAL HISTORY  Past Surgical History:   Procedure Laterality Date    HX CHOLECYSTECTOMY  2005    HX GI  2005,2006    hiatal hernia x 2    HX HYSTERECTOMY  1999    MIGUEL ANGEL    HX OTHER SURGICAL      groin, buttocks, axilla- sweat glands    HX UROLOGICAL   2009x23/2010    cyto,stentx3       FAMILY HISTORY  Family History   Problem Relation Age of Onset    Diabetes Mother     Hypertension Mother     Hypertension Brother     Heart Disease Brother     Lung Disease Brother     Diabetes Brother     Heart Disease Sister     Hypertension Sister     Heart Disease Brother     Lung Disease Brother     Alcohol abuse Neg Hx     Allergy-severe Neg Hx     Anemia Neg Hx     Asthma Neg Hx     Cancer Neg Hx     Depression Neg Hx     Heart Attack Neg Hx     Kidney Disease Neg Hx     Mental Retardation Neg Hx     Rashes/Skin Problems Neg Hx     Malignant Hyperthermia Neg Hx     Pseudocholinesterase Deficiency Neg Hx     Delayed Awakening Neg Hx     Post-op Nausea/Vomiting Neg Hx     Emergence Delirium Neg Hx     Post-op Cognitive Dysfunction Neg Hx     Other Neg Hx        SOCIAL HISTORY  Social History     Tobacco Use    Smoking status: Never Smoker    Smokeless tobacco: Never Used   Substance Use Topics    Alcohol use: No    Drug use: No       ALLERGIES  Allergies   Allergen Reactions    Trazodone Rash    Ultram [Tramadol] Rash       MEDICATIONS  Current Outpatient Medications on File Prior to Encounter   Medication Sig Dispense Refill    albuterol (PROVENTIL VENTOLIN) 2.5 mg /3 mL (0.083 %) nebu by Nebulization route.       mupirocin (BACTROBAN) 2 % ointment Apply to affected area 3 times daily as directed. 22 g 0    nystatin-triamcinolone (MYCOLOG II) topical cream Apply  to affected area two (2) times a day. 30 g 0    lisinopril-hydrochlorothiazide (PRINZIDE, ZESTORETIC) 20-12.5 mg per tablet Take  by mouth daily.  desoximetasone 0.05 % oint       oxyCODONE-acetaminophen (PERCOCET)  mg per tablet Take 1 Tablet by mouth every four (4) hours as needed. States dosage is 7.5 mg      LORazepam (ATIVAN) 1 mg tablet Take  by mouth three (3) times daily. No current facility-administered medications on file prior to encounter. REVIEW OF SYSTEMS  The patient has no difficulty with chest pain or shortness of breath. No fever or chills. Comprehensive review of systems was otherwise unremarkable except as noted above. Objective:   Physical Exam:   Recent vitals (if inpt):  Patient Vitals for the past 24 hrs:   BP Temp Pulse Resp SpO2 Height Weight   04/28/22 1502 132/79 97.9 °F (36.6 °C) (!) 56 18 100 % 5' 7\" (1.702 m) 149 lb (67.6 kg)       Visit Vitals  /79 (BP 1 Location: Right upper arm, BP Patient Position: Sitting)   Pulse (!) 56   Temp 97.9 °F (36.6 °C)   Resp 18   Ht 5' 7\" (1.702 m)   Wt 149 lb (67.6 kg)   SpO2 100%   BMI 23.34 kg/m²     Wt Readings from Last 3 Encounters:   04/28/22 149 lb (67.6 kg)   04/27/22 156 lb (70.8 kg)   07/20/21 148 lb (67.1 kg)     Constitutional: Alert, oriented, cooperative patient in no acute distress; appears stated age;  General appearance is within normal limits for wound care patient population. See the today's recorded vitals signs and constitutional data. Eyes: Pupils equal; Sclera are clear. EOMs intact; The eyes appear to track and move normally. The sclera are not injected. The conjunctive are clear. The eyelids are normal. There is no scleral icterus. ENMT: There are no obvious external ear, nose, lip or mouth lesions.  Nares normal; Neck: Overall contour of the neck is normal with no obvious neck masses. Gross hearing is within normal limits. No obvious neck masses  Resp:  Breathing is non-labored; normal rate and effort; no audible wheezing. CV: RRR;  no JVD; No evidence of cyanosis of the upper extremities. The extremities are perfused without embolic sign, splinter hemorrhages, or petechia. GI: soft and non-distended without acute abnormality noted. Musculoskeletal: unremarkable with normal function. No embolic signs or cyanosis. Neuro:  Oriented; moves all 4; no focal deficits  Psychiatric: Judgement and insight are within normal limits for the wound care population of patients. Patient is oriented to person, place, and time. Recent and remote memory are within normal limits. Mood and affect are within normal limits. Integumentary (Skin/Wounds)  See inspection of wound(s) below. There are no other skin areas of palpable concern. See wound center documentation including photos in the [address] Wound Template made part of this record by reference.      Wounds:  Wound Abdomen #1 mika-umbilical 74/08/84 (Active)   Wound Image   04/28/22 1539   Wound Etiology Other (Comment) 04/28/22 1539   Dressing Status Clean 04/28/22 1539   Cleansed Cleansed with saline 04/28/22 1539   Wound Length (cm) 1 cm 04/28/22 1539   Wound Width (cm) 7.1 cm 04/28/22 1539   Wound Depth (cm) 0.2 cm 04/28/22 1539   Wound Surface Area (cm^2) 7.1 cm^2 04/28/22 1539   Wound Volume (cm^3) 1.42 cm^3 04/28/22 1539   Drainage Amount Small 04/28/22 1539   Drainage Description Serosanguinous 04/28/22 1539   Wound Odor None 04/28/22 1539   Mika-Wound/Incision Assessment Fragile 04/28/22 1539   Edges Attached edges 04/28/22 1539   Wound Thickness Description Full thickness 04/28/22 1539   Number of days: 0       Wound Gluteal fold/cleft Proximal #2 Pilonidal  04/28/22 (Active)   Wound Image   04/28/22 1539   Dressing Status Old drainage noted 04/28/22 1539   Cleansed Cleansed with saline 04/28/22 1539   Wound Length (cm) 6 cm 04/28/22 1539   Wound Width (cm) 1.1 cm 04/28/22 1539   Wound Depth (cm) 0.2 cm 04/28/22 1539   Wound Surface Area (cm^2) 6.6 cm^2 04/28/22 1539   Wound Volume (cm^3) 1.32 cm^3 04/28/22 1539   Wound Assessment Pink/red 04/28/22 1539   Drainage Amount Moderate 04/28/22 1539   Drainage Description Serosanguinous 04/28/22 1539   Wound Odor None 04/28/22 1539   Carri-Wound/Incision Assessment Intact 04/28/22 1539   Edges Attached edges 04/28/22 1539   Wound Thickness Description Full thickness 04/28/22 1539   Number of days: 0       Wound Gluteal fold/cleft Distal #3 Pilonidal 04/28/22 (Active)   Wound Image   04/28/22 1539   Dressing Status Old drainage noted 04/28/22 1539   Cleansed Cleansed with saline 04/28/22 1539   Wound Length (cm) 2 cm 04/28/22 1539   Wound Width (cm) 3 cm 04/28/22 1539   Wound Depth (cm) 0.2 cm 04/28/22 1539   Wound Surface Area (cm^2) 6 cm^2 04/28/22 1539   Wound Volume (cm^3) 1.2 cm^3 04/28/22 1539   Wound Assessment Pink/red 04/28/22 1539   Drainage Amount Moderate 04/28/22 1539   Drainage Description Serosanguinous 04/28/22 1539   Wound Odor None 04/28/22 1539   Carri-Wound/Incision Assessment Intact 04/28/22 1539   Edges Attached edges 04/28/22 1539   Wound Thickness Description Full thickness 04/28/22 1539   Number of days: 0          Initial wounds 4/28/2022      Amount and/or Complexity of Data Reviewed and Analyzed:  I reviewed and analyzed all of the unique labs and radiologic studies that are shown below as well as any that are in the HPI, and any that are in the expanded problem list below  *Each unique test, order, or document contributes to the combination of 2 or combination of 3 in Category 1 below. I also independently reviewed radiology images for studies I described above in the HPI or studies I have ordered. For this visit I also reviewed old records and prior notes.     No results for input(s): WBC, HGB, PLT, NA, K, CL, CO2, BUN, CREA, GLU, PTP, INR, APTT, TBIL, TBILI, CBIL, ALT, AP, AML, AML, LPSE, LCAD, NH4, TROPT, TROIQ, PCO2, PO2, HCO3, HGBEXT, PLTEXT, INREXT in the last 72 hours. No lab exists for component: SGOT, GPT,  PH  No results for input(s): PTP, INR, APTT, TBIL, TBILI, CBIL, ALT, AP, AML, LPSE, INREXT in the last 72 hours. No lab exists for component: SGOT, GPT    Most recent blood counts (CBC) review  Lab Results   Component Value Date/Time    WBC 5.9 03/04/2017 04:45 PM    HGB 11.5 (L) 03/04/2017 04:45 PM    HCT 36.8 03/04/2017 04:45 PM    PLATELET 486 89/42/3952 04:45 PM    MCV 70.4 (L) 03/04/2017 04:45 PM     Most recent chemistry (BMP) test review   Lab Results   Component Value Date/Time    Sodium 143 03/04/2017 04:45 PM    Potassium 4.4 03/04/2017 04:45 PM    Chloride 108 (H) 03/04/2017 04:45 PM    CO2 29 03/04/2017 04:45 PM    Anion gap 6 (L) 03/04/2017 04:45 PM    Glucose 145 (H) 03/04/2017 04:45 PM    BUN 19 03/04/2017 04:45 PM    Creatinine 1.65 (H) 03/04/2017 04:45 PM    GFR est AA 40 (L) 03/04/2017 04:45 PM    GFR est non-AA 33 (L) 03/04/2017 04:45 PM    Calcium 9.5 03/04/2017 04:45 PM     Most recent Liver enzyme test review  Lab Results   Component Value Date/Time    ALT (SGPT) 28 03/04/2017 04:45 PM    AST (SGOT) 33 03/04/2017 04:45 PM    Alk.  phosphatase 165 (H) 03/04/2017 04:45 PM    Bilirubin, total 0.3 03/04/2017 04:45 PM     Most recent coagulation (coags) review  No results found for: INR, PTMR, PTP, PT1, PT2, INREXT  Lab Results   Component Value Date/Time    Lipase 128 03/04/2017 04:45 PM    Troponin-I <0.05 04/14/2012 04:17 PM       Diabetic assessment  No results found for: HBA1C, BVU9DCMM, BSZ4ZLST    Nutritional assessment screen to assess wound healing ability:  Lab Results   Component Value Date/Time    Protein, total 8.5 (H) 03/04/2017 04:45 PM    Albumin 3.6 03/04/2017 04:45 PM     Lab Results   Component Value Date/Time    Protein, total 8.5 (H) 03/04/2017 04:45 PM    Albumin 3.6 03/04/2017 04:45 PM       XR Results (most recent):  Results from Hospital Encounter encounter on 17    XR ABD ACUTE W 1 V CHEST    Narrative  AP chest radiograph, KUB supine and upright views    History: abdominal pain, 62 years Female Severe acute abdominal pain that  radiates back    Comparison:  Chest radiograph 2014    Findings:  Normal cardiomediastinal silhouette. Mild diffuse interstitial  prominence unchanged, nonspecific. Minimal subsegmental atelectasis bilateral  lung bases. No evidence of pneumothorax, pleural effusion, or air space  consolidation. Visualized soft tissue and osseous structures otherwise  unremarkable. No free air is evident. Stool is seen throughout the colon. The bowel gas  pattern is nonspecific and there is no evidence of ileus or obstruction. No  suspicious renal or ureteral calculi are evident. Evidence of cholecystectomy. Visualized osseous structures appear unremarkable. Impression  Impression: No acute pathology identified. CT Results (most recent):  Results from Hospital Encounter encounter on 09    [place] [id number] [facility]    NAME: Zana Jose  PT : 1955               SEX: F         MR#: [id number]  LOCATION/NS: ER-                 AGE: 81M      ACCT: [de-identified]  ORDERING: Lea Ku                     PT TYPE: TAMMI Rossi ([id number])  Final Report      ICD Codes / Adm. Diagnosis:    /  Examination:  Mahad Katherine  - [id number] - Aug 21 2009  7:27PM    Reason:  left flank pain    ICD Codes / Adm. Diagnosis:    /  Examination:  Mahad Katherine  - [id number] - Aug 21 2009  7:27PM    CT OF THE ABDOMEN AND PELVIS WITHOUT CONTRAST, 09    Reason:  Left flank pain since ureteral stent placed 09. REPORT:    TECHNIQUE:  Noncontrast axial images were obtained through the abdomen and  pelvis. COMPARISON:  2009.     FINDINGS: Included portions of the lung bases are clear. ABDOMEN:  Since the previous study, a left-sided ureteral stent has been  placed. There has been an interval decompression of the left renal  collecting system. A residual 5-6 mm nonobstructing calculus is present in  the left renal pelvis. Multiple additional left-sided renal and ureteral  calculi have been removed. No right-sided renal or ureteral calculi are  present. Cholecystectomy clips are present. The top of the liver and  spleen are excluded from this study and cannot be evaluated. Evaluation of  the abdominal viscera is suboptimal without IV contrast.  The unenhanced  appearance of the included portions of the liver, included portions of the  spleen, pancreas and adrenal glands is normal.  The appendix is normal in  appearance. PELVIS:  A few sigmoid diverticula are present without changes of  diverticulitis. The bladder is normal in appearance. Changes of a  hysterectomy are present. IMPRESSION:    1. INTERVAL PLACEMENT OF A LEFT-SIDED URETERAL STENT WITH DECOMPRESSION OF  LEFT-SIDED HYDRONEPHROSIS AND REMOVAL OF MULTIPLE LEFT-SIDED RENAL CALCULI. A SINGLE NONOBSTRUCTING 5-6 MM STONE PERSISTS WITHIN THE LEFT RENAL PELVIS. 2. CHOLECYSTECTOMY AND HYSTERECTOMY.     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 *          Plan:     Number and Complexity of Problems addressed and   Risks of complications and/or morbidity of management    Treatment Note please see attached Discharge Instructions  Any procedures done today in the wound center (if documented in a separate note) in [facility] are made part of this record by reference  Written patient dismissal instructions given to patient or POA. Pilonidal disease  Chronic dermatitis    Presents to the wound center on 4/28/2022, after being seen in the ER 1 day prior. She had 2 complaints. She has a chronic periumbilical dermatitis that had typically responded to Bactroban that began developing cracks. She was told to continue this treatment. She also was noted to have evidence of pilonidal type irritation and was also advised to use Bactroban. She uses Dial soap. She has history of hidradenitis treated in both axillae. She is currently permanently disabled from that condition. I think that her periumbilical dermatitis may actually be fungal in origin from overtreatment. I am recommending that she begin to shower with Nizoral shampoo as an antifungal agent. We will monitor this process and for not to use any more Bactroban. With regards to her pilonidal disease. She had extensive pilonidal cystectomy by Dr. Lucy Elise many years ago. She has a very deep gluteal cleft. Disease has reopened and is an ulcerated wound with 2 areas. She is not hirsute. She may be a candidate for cleft lift procedure when we get better control of her dermatitis. For now we will dress the pilonidal area with Aquacel Ag. We will institute these first 2 treatments and see her back in 1 week. Wound Care  Orders Placed This Encounter    WOUND CARE, DRESSING CHANGE     Abdominal (Carri-Umbilical) Wound:(Fungal)  Nizoral Shampoo - wash entire body. Follow Prescription instructions.   May drape Dry Gauze across wound for protection. Pilonidal Surgical Wounds/Gluteal Fold Wounds:  Cleanse with Normal Saline  Moisture Barrier to periwound  Aquacel Ag (strip) to wound bed. Cover with pad (may use ABD surgical pad or feminine hygiene pad)  Secure in place with undergarment. Dressing change daily. Standing Status:   Standing     Number of Occurrences:   1    albuterol (PROVENTIL VENTOLIN) 2.5 mg /3 mL (0.083 %) nebu     Sig: by Nebulization route. Follow-up Information     Follow up With Specialties Details Why Contact Info    [address] In 1 week For wound re-check [address] [place] [address] [address] [phone]                  Level of MDM (2/3 elements below)  Number and Complexity of Problems Addressed Amount and/or Complexity of Data to be Reviewed and Analyzed  *Each unique test, order, or document contributes to the combination of 2 or combination of 3 in Category 1 below.  Risk of Complications and/or Morbidity or Mortality of pt Management     36655  19472 SF Minimal  1self-limited or minor problem Minimal or none Minimal risk of morbidity from additional diagnostic testing or Rx   50073  65756 Low Low  2or more self-limited or minor problems;    or  1stable chronic illness;    or  2KSNBG, uncomplicated illness or injury   Limited  (Must meet the requirements of at least 1 of the 2 categories)  Category 1: Tests and documents   Any combination of 2 from the following:  Review of prior external note(s) from each unique source*;  review of the result(s) of each unique test*;   ordering of each unique test*    or   Category 2: Assessment requiring an independent historian(s)  (For the categories of independent interpretation of tests and discussion of management or test interpretation, see moderate or high) Low risk of morbidity from additional diagnostic testing or treatment     96519  13025 Mod Moderate  1or more chronic illnesses with exacerbation, progression, or side effects of treatment;    or  2or more stable chronic illnesses;    or  1undiagnosed new problem with uncertain prognosis;    or  1acute illness with systemic symptoms;    or  2XCBRY complicated injury   Moderate  (Must meet the requirements of at least 1 out of 3 categories)  Category 1: Tests, documents, or independent historian(s)  Any combination of 3 from the following:   Review of prior external note(s) from each unique source*;  Review of the result(s) of each unique test*;  Ordering of each unique test*;  Assessment requiring an independent historian(s)    or  Category 2: Independent interpretation of tests   Independent interpretation of a test performed by another physician/other qualified health care professional (not separately reported);     or  Category 3: Discussion of management or test interpretation  Discussion of management or test interpretation with external physician/other qualified health care professional/appropriate source (not separately reported)   Moderate risk of morbidity from additional diagnostic testing or treatment  Examples only:  Prescription drug management   Decision regarding minor surgery with identified patient or procedure risk factors  Decision regarding elective major surgery without identified patient or procedure risk factors   Diagnosis or treatment significantly limited by social determinants of health       [id number] High High  1or more chronic illnesses with severe exacerbation, progression, or side effects of treatment;    or  1 acute or chronic illness or injury that poses a threat to life or bodily function   Extensive  (Must meet the requirements of at least 2 out of 3 categories)  Category 1: Tests, documents, or independent historian(s)  Any combination of 3 from the following:   Review of prior external note(s) from each unique source*;  Review of the result(s) of each unique test*;   Ordering of each unique test*; Assessment requiring an independent historian(s)    or   Category 2: Independent interpretation of tests   Independent interpretation of a test performed by another physician/other qualified health care professional (not separately reported);     or  Category 3: Discussion of management or test interpretation  Discussion of management or test interpretation with external physician/other qualified health care professional/appropriate source (not separately reported)   High risk of morbidity from additional diagnostic testing or treatment  Examples only:  Drug therapy requiring intensive monitoring for toxicity  Decision regarding elective major surgery with identified patient or procedure risk factors  Decision regarding emergency major surgery  Decision regarding hospitalization  Decision not to resuscitate or to de-escalate care because of poor prognosis                 I have personally performed a face-to-face diagnostic evaluation and management  service on this patient. I have independently seen the patient. I have independently obtained the above history from the patient/family. I have independently examined the patient with above findings. I have independently reviewed data/labs for this patient and developed the above plan of care (MDM).   Electronically signed by Teagan Hope MD on 4/28/2022 at 9:29 PM

## 2022-05-05 ENCOUNTER — HOSPITAL ENCOUNTER (OUTPATIENT)
Dept: WOUND CARE | Age: 67
Discharge: HOME | End: 2022-05-05
Attending: SURGERY
Payer: MEDICARE

## 2022-05-05 VITALS
WEIGHT: 148 LBS | HEART RATE: 63 BPM | HEIGHT: 67 IN | DIASTOLIC BLOOD PRESSURE: 79 MMHG | TEMPERATURE: 97.7 F | RESPIRATION RATE: 18 BRPM | OXYGEN SATURATION: 99 % | BODY MASS INDEX: 23.23 KG/M2 | SYSTOLIC BLOOD PRESSURE: 128 MMHG

## 2022-05-05 DIAGNOSIS — L98.8 PILONIDAL DISEASE: ICD-10-CM

## 2022-05-05 DIAGNOSIS — L30.9 CHRONIC DERMATITIS: ICD-10-CM

## 2022-05-05 PROCEDURE — 99213 OFFICE O/P EST LOW 20 MIN: CPT

## 2022-05-05 PROCEDURE — 99214 OFFICE O/P EST MOD 30 MIN: CPT | Performed by: SURGERY

## 2022-05-05 RX ORDER — FLUCONAZOLE 200 MG/1
200 TABLET ORAL DAILY
COMMUNITY

## 2022-05-05 NOTE — WOUND CARE
[address]  [address], [address]  [place]   Phone: [phone]  Fax: [phone]    Patient: Danna Mensah MRN: [id number]  SSN: [id number]    YOB: 1955  Age: 69 y.o. Sex: female       Return Appointment: 3 weeks with Army Chris MD    Instructions: Abdominal (Carri-Umbilical) Wound:(Fungal)  Nizoral Shampoo - wash entire body. Apply zinc oxide paste/Desitin to open areas and cover with dry gauze. Secure with undergarment.     Sacral and perianal wounds:  Cleanse with Normal Saline  Alginate with Ag (strip) to wound bed. Secure with undergarment. Dressings change daily. Home health to change dressing 3 times per week (patient to change on other days)  Do not use Mupirocin ointment to any wounds. Do not use Tegaderm to cover wounds. Continue to take Diflucan until finished. Should you experience increased redness, swelling, pain, foul odor, size of wound(s), or have a temperature over 101 degrees please contact the [address] [place] [address] Road at [phone] or if after hours contact your primary care physician or go to the hospital emergency department.     Signed By: Jose San, PT, [address] [facility]     May 5, 2022

## 2022-05-05 NOTE — DISCHARGE INSTRUCTIONS
[address]  [address], [address]   Phone: [phone]  Fax: [phone]    Patient: Los Howe MRN: [id number]  SSN: [id number]    YOB: 1955  Age: 69 y.o. Sex: female       Return Appointment: 3 weeks with Kvng Damon MD    Instructions: Abdominal (Carri-Umbilical) Wound:(Fungal)  Nizoral Shampoo - wash entire body. Apply zinc oxide paste/Desitin to open areas and cover with dry gauze. Secure with undergarment.     Sacral and perianal wounds:  Cleanse with Normal Saline  Alginate with Ag (strip) to wound bed. Secure with undergarment. Dressings change daily.     Home health to change dressing 3 times per week (patient to change on other days)  Do not use Mupirocin ointment to any wounds. Do not use Tegaderm to cover wounds. Continue to take Diflucan until finished. Should you experience increased redness, swelling, pain, foul odor, size of wound(s), or have a temperature over 101 degrees please contact the [address] [place] [address] Road at [phone] or if after hours contact your primary care physician or go to the hospital emergency department.     Signed By: Ronald Gonzalez PT, [facility]     May 5, 2022

## 2022-05-05 NOTE — PROGRESS NOTES
[address]  Consult Note/H&P/Progress Note      [address] RECORD NUMBER:  [id number]  AGE: 69 y.o. RACE BLACK/  GENDER: female  : 1955  EPISODE DATE:  2022       Subjective:      CC (Chief Complaint) and HISTORY of PRESENT ILLNESS (HPI):    Los Howe is a 69 y.o. female who presents today for wound/ulcer evaluation. She was seen in the ER 1 day prior to her initial visit to the wound center on 2022. She was noted to have open wounds from prior pilonidal cystectomy performed by Dr. Suzan Villarreal approximately 20 years ago. She has chronic wounds that open and close with some response to Bactroban. She also has a chronic periumbilical rash that has developed cracks. She also treats this with Bactroban. She washes with Dial soap. She has a history of hidradenitis of both axilla which were treated surgically. She is currently and has been permanently disabled for this condition. She also has a history of hepatitis C that has been treated and eradicated. She is never smoker. She is a never drinker. She returns on 2022. She went to a dermatologist who also gave her a prescription for fluconazole. She is using the Nizoral shampoo as a body wash. It is unclear whether she is also placing mupirocin on the umbilical area as well. The periumbilical area appears to be very chronic and notes show that this dates back many years. It has been called lichen planus. I am not sure how much we will get this to calm down. The recurrent pilonidal disease periwound irritation is calming down also. It is being dressed with silver alginate and improving.     This information was obtained from the patient  The following HPI elements were documented for the patient's wound:  Location: Gluteal cleft, periumbilical  Duration: Chronic for many years  Severity: Waxing and waning severity  Context: Surgically treated pilonidal disease, periumbilical rash  Modifying Factors:  Nothing makes better or worse  Quality: Painful  Timing: Often  Associated Signs and Symptoms: Skin breakdown, irritation, open wounds      Ulcer Identification:  Ulcer Type: non-healing surgical, undetermined, pilonidal cyst and Rash    Contributing Factors: Deep cleft, overtreatment with antibacterials            PAST MEDICAL HISTORY  Past Medical History:   Diagnosis Date    Arthritis     osteo    Cataract     left eye    Chronic kidney disease     renal calc.  Chronic pain     joint    Gastrointestinal disorder     ulcer    GERD (gastroesophageal reflux disease)     takes occassional OTC meds    Hypertension     controlled by meds    Kidney stones     Other ill-defined conditions(799.89)     \"has no sweat glands\", sickle  cell    sickle cell trait     last crisis 4/14, well controlled per pt.         PAST SURGICAL HISTORY  Past Surgical History:   Procedure Laterality Date    HX CHOLECYSTECTOMY  2005    HX GI  2005,2006    hiatal hernia x 2    HX HYSTERECTOMY  1999    MIGUEL ANGEL    HX OTHER SURGICAL      groin, buttocks, axilla- sweat glands    HX UROLOGICAL   2009x23/2010    cyto,stentx3       FAMILY HISTORY  Family History   Problem Relation Age of Onset    Diabetes Mother     Hypertension Mother     Hypertension Brother     Heart Disease Brother     Lung Disease Brother     Diabetes Brother     Heart Disease Sister     Hypertension Sister     Heart Disease Brother     Lung Disease Brother     Alcohol abuse Neg Hx     Allergy-severe Neg Hx     Anemia Neg Hx     Asthma Neg Hx     Cancer Neg Hx     Depression Neg Hx     Heart Attack Neg Hx     Kidney Disease Neg Hx     Mental Retardation Neg Hx     Rashes/Skin Problems Neg Hx     Malignant Hyperthermia Neg Hx     Pseudocholinesterase Deficiency Neg Hx     Delayed Awakening Neg Hx     Post-op Nausea/Vomiting Neg Hx     Emergence Delirium Neg Hx     Post-op Cognitive Dysfunction Neg Hx     Other Neg Hx        SOCIAL HISTORY  Social History     Tobacco Use    Smoking status: Never Smoker    Smokeless tobacco: Never Used   Substance Use Topics    Alcohol use: No    Drug use: No       ALLERGIES  Allergies   Allergen Reactions    Trazodone Rash    Ultram [Tramadol] Rash       MEDICATIONS  Current Outpatient Medications on File Prior to Encounter   Medication Sig Dispense Refill    fluconazole (Diflucan) 200 mg tablet Take 200 mg by mouth daily. FDA advises cautious prescribing of oral fluconazole in pregnancy.  albuterol (PROVENTIL VENTOLIN) 2.5 mg /3 mL (0.083 %) nebu by Nebulization route.  mupirocin (BACTROBAN) 2 % ointment Apply to affected area 3 times daily as directed. 22 g 0    nystatin-triamcinolone (MYCOLOG II) topical cream Apply  to affected area two (2) times a day. 30 g 0    lisinopril-hydrochlorothiazide (PRINZIDE, ZESTORETIC) 20-12.5 mg per tablet Take  by mouth daily.  desoximetasone 0.05 % oint       oxyCODONE-acetaminophen (PERCOCET)  mg per tablet Take 1 Tablet by mouth every four (4) hours as needed. States dosage is 7.5 mg      LORazepam (ATIVAN) 1 mg tablet Take  by mouth three (3) times daily. No current facility-administered medications on file prior to encounter. REVIEW OF SYSTEMS  The patient has no difficulty with chest pain or shortness of breath. No fever or chills. Comprehensive review of systems was otherwise unremarkable except as noted above.       Objective:   Physical Exam:   Recent vitals (if inpt):  Patient Vitals for the past 24 hrs:   BP Temp Pulse Resp SpO2 Height Weight   05/05/22 1518 128/79 97.7 °F (36.5 °C) 63 18 99 % 5' 7\" (1.702 m) 148 lb (67.1 kg)       Visit Vitals  /79 (BP 1 Location: Right upper arm)   Pulse 63   Temp 97.7 °F (36.5 °C)   Resp 18   Ht 5' 7\" (1.702 m)   Wt 148 lb (67.1 kg)   SpO2 99%   BMI 23.18 kg/m²     Wt Readings from Last 3 Encounters:   05/05/22 148 lb (67.1 kg)   04/28/22 149 lb (67.6 kg)   04/27/22 156 lb (70.8 kg)     Constitutional: Alert, oriented, cooperative patient in no acute distress; appears stated age;  General appearance is within normal limits for wound care patient population. See the today's recorded vitals signs and constitutional data. Eyes: Pupils equal; Sclera are clear. EOMs intact; The eyes appear to track and move normally. The sclera are not injected. The conjunctive are clear. The eyelids are normal. There is no scleral icterus. ENMT: There are no obvious external ear, nose, lip or mouth lesions. Nares normal; Neck: Overall contour of the neck is normal with no obvious neck masses. Gross hearing is within normal limits. No obvious neck masses  Resp:  Breathing is non-labored; normal rate and effort; no audible wheezing. CV: RRR;  no JVD; No evidence of cyanosis of the upper extremities. The extremities are perfused without embolic sign, splinter hemorrhages, or petechia. GI: soft and non-distended without acute abnormality noted. Musculoskeletal: unremarkable with normal function. No embolic signs or cyanosis. Neuro:  Oriented; moves all 4; no focal deficits  Psychiatric: Judgement and insight are within normal limits for the wound care population of patients. Patient is oriented to person, place, and time. Recent and remote memory are within normal limits. Mood and affect are within normal limits. Integumentary (Skin/Wounds)  See inspection of wound(s) below. There are no other skin areas of palpable concern. See wound center documentation including photos in the [address] Wound Template made part of this record by reference.      Wounds:  Wound Abdomen #1 mika-umbilical 75/70/98 (Active)   Wound Image   05/05/22 1552   Wound Etiology Other (Comment) 04/28/22 1539   Dressing Status Old drainage noted 05/05/22 1552   Cleansed Cleansed with saline 05/05/22 1552   Wound Length (cm) 1 cm 05/05/22 1552   Wound Width (cm) 7.1 cm 05/05/22 1552   Wound Depth (cm) 0.1 cm 05/05/22 1552   Wound Surface Area (cm^2) 7.1 cm^2 05/05/22 1552   Change in Wound Size % 0 05/05/22 1552   Wound Volume (cm^3) 0.71 cm^3 05/05/22 1552   Wound Healing % 50 05/05/22 1552   Drainage Amount Moderate 05/05/22 1552   Drainage Description Serosanguinous 05/05/22 1552   Wound Odor None 05/05/22 1552   Carri-Wound/Incision Assessment Fragile 05/05/22 1552   Edges Attached edges 04/28/22 1539   Wound Thickness Description Full thickness 05/05/22 1552   Number of days: 7       Wound Sacral/coccyx #2 Pilonidal  04/28/22 (Active)   Wound Image   05/05/22 1552   Dressing Status Old drainage noted 05/05/22 1552   Cleansed Cleansed with saline 05/05/22 1552   Dressing/Treatment Alginate with Ag 05/05/22 1552   Wound Length (cm) 4 cm 05/05/22 1552   Wound Width (cm) 1 cm 05/05/22 1552   Wound Depth (cm) 0.2 cm 05/05/22 1552   Wound Surface Area (cm^2) 4 cm^2 05/05/22 1552   Change in Wound Size % 39.39 05/05/22 1552   Wound Volume (cm^3) 0.8 cm^3 05/05/22 1552   Wound Healing % 39 05/05/22 1552   Wound Assessment Granulation tissue 05/05/22 1552   Drainage Amount Moderate 05/05/22 1552   Drainage Description Serosanguinous 05/05/22 1552   Wound Odor None 05/05/22 1552   Carri-Wound/Incision Assessment Intact 05/05/22 1552   Edges Attached edges 04/28/22 1539   Wound Thickness Description Full thickness 05/05/22 1552   Number of days: 7       Wound #3 Perianal 04/28/22 (Active)   Wound Image   05/05/22 1552   Dressing Status Old drainage noted 05/05/22 1552   Cleansed Cleansed with saline 05/05/22 1552   Dressing/Treatment Alginate with Ag 05/05/22 1552   Wound Length (cm) 1.2 cm 05/05/22 1552   Wound Width (cm) 0.3 cm 05/05/22 1552   Wound Depth (cm) 0.2 cm 05/05/22 1552   Wound Surface Area (cm^2) 0.36 cm^2 05/05/22 1552   Change in Wound Size % 94 05/05/22 1552   Wound Volume (cm^3) 0.072 cm^3 05/05/22 1552   Wound Healing % 94 05/05/22 1552   Wound Assessment Granulation tissue 05/05/22 1552   Drainage Amount Moderate 05/05/22 1552   Drainage Description Serosanguinous 05/05/22 1552   Wound Odor None 05/05/22 1552   Carri-Wound/Incision Assessment Intact 05/05/22 1552   Edges Attached edges 04/28/22 1539   Wound Thickness Description Full thickness 05/05/22 1552   Number of days: 7          Initial wounds 4/28/2022      Amount and/or Complexity of Data Reviewed and Analyzed:  I reviewed and analyzed all of the unique labs and radiologic studies that are shown below as well as any that are in the HPI, and any that are in the expanded problem list below  *Each unique test, order, or document contributes to the combination of 2 or combination of 3 in Category 1 below. I also independently reviewed radiology images for studies I described above in the HPI or studies I have ordered. For this visit I also reviewed old records and prior notes. No results for input(s): WBC, HGB, PLT, NA, K, CL, CO2, BUN, CREA, GLU, PTP, INR, APTT, TBIL, TBILI, CBIL, ALT, AP, AML, AML, LPSE, LCAD, NH4, TROPT, TROIQ, PCO2, PO2, HCO3, HGBEXT, PLTEXT, INREXT, HGBEXT, PLTEXT, INREXT in the last 72 hours. No lab exists for component: SGOT, GPT,  PH  No results for input(s): PTP, INR, APTT, TBIL, TBILI, CBIL, ALT, AP, AML, LPSE, INREXT, INREXT in the last 72 hours.     No lab exists for component: SGOT, GPT    Most recent blood counts (CBC) review  Lab Results   Component Value Date/Time    WBC 5.9 03/04/2017 04:45 PM    HGB 11.5 (L) 03/04/2017 04:45 PM    HCT 36.8 03/04/2017 04:45 PM    PLATELET 425 75/22/5485 04:45 PM    MCV 70.4 (L) 03/04/2017 04:45 PM     Most recent chemistry (BMP) test review   Lab Results   Component Value Date/Time    Sodium 143 03/04/2017 04:45 PM    Potassium 4.4 03/04/2017 04:45 PM    Chloride 108 (H) 03/04/2017 04:45 PM    CO2 29 03/04/2017 04:45 PM    Anion gap 6 (L) 03/04/2017 04:45 PM    Glucose 145 (H) 03/04/2017 04:45 PM    BUN 19 03/04/2017 04:45 PM Creatinine 1.65 (H) 03/04/2017 04:45 PM    GFR est AA 40 (L) 03/04/2017 04:45 PM    GFR est non-AA 33 (L) 03/04/2017 04:45 PM    Calcium 9.5 03/04/2017 04:45 PM     Most recent Liver enzyme test review  Lab Results   Component Value Date/Time    ALT (SGPT) 28 03/04/2017 04:45 PM    AST (SGOT) 33 03/04/2017 04:45 PM    Alk. phosphatase 165 (H) 03/04/2017 04:45 PM    Bilirubin, total 0.3 03/04/2017 04:45 PM     Most recent coagulation (coags) review  No results found for: INR, PTMR, PTP, PT1, PT2, INREXT, INREXT  Lab Results   Component Value Date/Time    Lipase 128 03/04/2017 04:45 PM    Troponin-I <0.05 04/14/2012 04:17 PM       Diabetic assessment  No results found for: HBA1C, RHG8KXGO, AUR2CRHD, OEX1MUFC    Nutritional assessment screen to assess wound healing ability:  Lab Results   Component Value Date/Time    Protein, total 8.5 (H) 03/04/2017 04:45 PM    Albumin 3.6 03/04/2017 04:45 PM     Lab Results   Component Value Date/Time    Protein, total 8.5 (H) 03/04/2017 04:45 PM    Albumin 3.6 03/04/2017 04:45 PM       XR Results (most recent):  Results from Hospital Encounter encounter on 03/04/17    XR ABD ACUTE W 1 V CHEST    Narrative  AP chest radiograph, KUB supine and upright views    History: abdominal pain, 62 years Female Severe acute abdominal pain that  radiates back    Comparison:  Chest radiograph March 22, 2014    Findings:  Normal cardiomediastinal silhouette. Mild diffuse interstitial  prominence unchanged, nonspecific. Minimal subsegmental atelectasis bilateral  lung bases. No evidence of pneumothorax, pleural effusion, or air space  consolidation. Visualized soft tissue and osseous structures otherwise  unremarkable. No free air is evident. Stool is seen throughout the colon. The bowel gas  pattern is nonspecific and there is no evidence of ileus or obstruction. No  suspicious renal or ureteral calculi are evident. Evidence of cholecystectomy.   Visualized osseous structures appear unremarkable. Impression  Impression: No acute pathology identified. CT Results (most recent):  Results from Hospital Encounter encounter on 09    [facility], [address] [facility]    NAME: Abdulaziz De La Paz  PT : 1955               SEX: F         MR#: [id number]  LOCATION/NS: ER-                 AGE: 14P      ACCT: [de-identified]  ORDERING: Digna Tesfaye                     PT TYPE: E  RADIOLOGISTEvaline Closs ([id number])  Final Report      ICD Codes / Adm. Diagnosis:    /  Examination:  Cheo Cunningham  - [id number] - Aug 21 2009  7:27PM    Reason:  left flank pain    ICD Codes / Adm. Diagnosis:    /  Examination:  Cheo Range  - [id number] - Aug 21 2009  7:27PM    CT OF THE ABDOMEN AND PELVIS WITHOUT CONTRAST, 09    Reason:  Left flank pain since ureteral stent placed 09. REPORT:    TECHNIQUE:  Noncontrast axial images were obtained through the abdomen and  pelvis. COMPARISON:  2009. FINDINGS: Included portions of the lung bases are clear. ABDOMEN:  Since the previous study, a left-sided ureteral stent has been  placed. There has been an interval decompression of the left renal  collecting system. A residual 5-6 mm nonobstructing calculus is present in  the left renal pelvis. Multiple additional left-sided renal and ureteral  calculi have been removed. No right-sided renal or ureteral calculi are  present. Cholecystectomy clips are present. The top of the liver and  spleen are excluded from this study and cannot be evaluated. Evaluation of  the abdominal viscera is suboptimal without IV contrast.  The unenhanced  appearance of the included portions of the liver, included portions of the  spleen, pancreas and adrenal glands is normal.  The appendix is normal in  appearance. PELVIS:  A few sigmoid diverticula are present without changes of  diverticulitis. The bladder is normal in appearance. Changes of a  hysterectomy are present. IMPRESSION:    1. INTERVAL PLACEMENT OF A LEFT-SIDED URETERAL STENT WITH DECOMPRESSION OF  LEFT-SIDED HYDRONEPHROSIS AND REMOVAL OF MULTIPLE LEFT-SIDED RENAL CALCULI. A SINGLE NONOBSTRUCTING 5-6 MM STONE PERSISTS WITHIN THE LEFT RENAL PELVIS. 2. CHOLECYSTECTOMY AND HYSTERECTOMY. 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 *          Plan:     Number and Complexity of Problems addressed and   Risks of complications and/or morbidity of management    Treatment Note please see attached Discharge Instructions  Any procedures done today in the wound center (if documented in a separate note) in [facility] are made part of this record by reference  Written patient dismissal instructions given to patient or POA. Pilonidal disease  Chronic dermatitis    Presents to the wound center on 4/28/2022, after being seen in the ER 1 day prior. She had 2 complaints. She has a chronic periumbilical dermatitis that had typically responded to Bactroban that began developing cracks. She was told to continue this treatment. She also was noted to have evidence of pilonidal type irritation and was also advised to use Bactroban. She uses Dial soap.   She has history of hidradenitis treated in both axillae. She is currently permanently disabled from that condition. I think that her periumbilical dermatitis may actually be fungal in origin from overtreatment. I am recommending that she begin to shower with Nizoral shampoo as an antifungal agent. We will monitor this process and for not to use any more Bactroban. With regards to her pilonidal disease. She had extensive pilonidal cystectomy by Dr. Crystal Goltz many years ago. She has a very deep gluteal cleft. Disease has reopened and is an ulcerated wound with 2 areas. She is not hirsute. She may be a candidate for cleft lift procedure when we get better control of her dermatitis. For now we will dress the pilonidal area with Aquacel Ag. We will institute these first 2 treatments and see her back in 1 week. She returns on 5/5/2022. She has seen a dermatologist in the interim who gave her a prescription for Diflucan. I have asked her still to hold off on the use of Bactroban and any harsh body washes. I would like her to continue to use the Nizoral shampoo as a body wash. The skin appears to be clearing up especially the gluteal region. The chronic dermatitis at the periumbilical region has been classified as lichen planus on older notes. I think this will be very hard to control. I think as she continues to improve we can consider cleft lift flap reconstruction of the gluteal cleft. We will need to make sure that she is medically ready for this procedure. Her medical condition is still unknown. We will institute Desitin to the abdomen and continue with silver alginate to the pilonidal area. I will have her come back in 3 weeks. Wound Care  Orders Placed This Encounter    WOUND CARE, DRESSING CHANGE     Abdominal (Carri-Umbilical) Wound:(Fungal)  Nizoral Shampoo - wash entire body. Apply zinc oxide paste/Desitin to open areas and cover with dry gauze.   Secure with undergarment.     Sacral and perianal wounds:  Cleanse with Normal Saline  Alginate with Ag (strip) to wound bed. Secure with undergarment. Dressings change daily.     Home health to change dressing 3 times per week (patient to change on other days)  Do not use Mupirocin ointment to any wounds. Do not use Tegaderm to cover wounds. Continue to take Diflucan until finished. Standing Status:   Standing     Number of Occurrences:   1    fluconazole (Diflucan) 200 mg tablet     Sig: Take 200 mg by mouth daily. FDA advises cautious prescribing of oral fluconazole in pregnancy. Follow-up Information     Follow up With Specialties Details Why Contact Info    [address] In 3 weeks For wound re-check [address] [facility] [address] [phone]                  Level of MDM (2/3 elements below)  Number and Complexity of Problems Addressed Amount and/or Complexity of Data to be Reviewed and Analyzed  *Each unique test, order, or document contributes to the combination of 2 or combination of 3 in Category 1 below.  Risk of Complications and/or Morbidity or Mortality of pt Management     50730  04365  Minimal  1self-limited or minor problem Minimal or none Minimal risk of morbidity from additional diagnostic testing or Rx   94638  10173 Low Low  2or more self-limited or minor problems;    or  1stable chronic illness;    or  7OLLNV, uncomplicated illness or injury   Limited  (Must meet the requirements of at least 1 of the 2 categories)  Category 1: Tests and documents   Any combination of 2 from the following:  Review of prior external note(s) from each unique source*;  review of the result(s) of each unique test*;   ordering of each unique test*    or   Category 2: Assessment requiring an independent historian(s)  (For the categories of independent interpretation of tests and discussion of management or test interpretation, see moderate or high) Low risk of morbidity from additional diagnostic testing or treatment     49372  55537 Mod Moderate  1or more chronic illnesses with exacerbation, progression, or side effects of treatment;    or  2or more stable chronic illnesses;    or  1undiagnosed new problem with uncertain prognosis;    or  1acute illness with systemic symptoms;    or  1YXPOS complicated injury   Moderate  (Must meet the requirements of at least 1 out of 3 categories)  Category 1: Tests, documents, or independent historian(s)  Any combination of 3 from the following:   Review of prior external note(s) from each unique source*;  Review of the result(s) of each unique test*;  Ordering of each unique test*;  Assessment requiring an independent historian(s)    or  Category 2: Independent interpretation of tests   Independent interpretation of a test performed by another physician/other qualified health care professional (not separately reported);     or  Category 3: Discussion of management or test interpretation  Discussion of management or test interpretation with external physician/other qualified health care professional/appropriate source (not separately reported)   Moderate risk of morbidity from additional diagnostic testing or treatment  Examples only:  Prescription drug management   Decision regarding minor surgery with identified patient or procedure risk factors  Decision regarding elective major surgery without identified patient or procedure risk factors   Diagnosis or treatment significantly limited by social determinants of health       66192  28494 High High  1or more chronic illnesses with severe exacerbation, progression, or side effects of treatment;    or  1 acute or chronic illness or injury that poses a threat to life or bodily function   Extensive  (Must meet the requirements of at least 2 out of 3 categories)  Category 1: Tests, documents, or independent historian(s)  Any combination of 3 from the following:   Review of prior external note(s) from each unique source*;  Review of the result(s) of each unique test*;   Ordering of each unique test*;   Assessment requiring an independent historian(s)    or   Category 2: Independent interpretation of tests   Independent interpretation of a test performed by another physician/other qualified health care professional (not separately reported);     or  Category 3: Discussion of management or test interpretation  Discussion of management or test interpretation with external physician/other qualified health care professional/appropriate source (not separately reported)   High risk of morbidity from additional diagnostic testing or treatment  Examples only:  Drug therapy requiring intensive monitoring for toxicity  Decision regarding elective major surgery with identified patient or procedure risk factors  Decision regarding emergency major surgery  Decision regarding hospitalization  Decision not to resuscitate or to de-escalate care because of poor prognosis                 I have personally performed a face-to-face diagnostic evaluation and management  service on this patient. I have independently seen the patient. I have independently obtained the above history from the patient/family. I have independently examined the patient with above findings. I have independently reviewed data/labs for this patient and developed the above plan of care (MDM).   Electronically signed by Denise Hills MD on 5/5/2022 at 9:29 PM

## 2022-05-05 NOTE — PROGRESS NOTES
05/05/22 1552   Wound Sacral/coccyx #2 Pilonidal  04/28/22   Date First Assessed/Time First Assessed: 04/28/22 1510   Present on Hospital Admission: Yes  Wound Approximate Age at First Assessment (Weeks): (c)   Primary Wound Type: Other (comment)  Location: Sacral/coccyx  Wound Description: #2 Pilonidal   Date . .. Wound Image    Dressing Status Old drainage noted   Cleansed Cleansed with saline   Dressing/Treatment Alginate with Ag   Wound Length (cm) 4 cm   Wound Width (cm) 1 cm   Wound Depth (cm) 0.2 cm   Wound Surface Area (cm^2) 4 cm^2   Change in Wound Size % 39.39   Wound Volume (cm^3) 0.8 cm^3   Wound Healing % 39   Wound Assessment Granulation tissue   Drainage Amount Moderate   Drainage Description Serosanguinous   Wound Odor None   Mika-Wound/Incision Assessment Intact   Wound Thickness Description Full thickness   Wound Abdomen #1 mika-umbilical 39/90/38   Date First Assessed/Time First Assessed: 04/28/22 1507   Present on Hospital Admission: Yes  Wound Approximate Age at First Assessment (Weeks): (c) 6 weeks  Primary Wound Type: Moisture Assoc. Skin Damage  Location: Abdomen  Wound Description: #1 mika. .. Wound Image    Dressing Status Old drainage noted   Cleansed Cleansed with saline   Dressing/Treatment   (\"ointment\")   Wound Length (cm) 1 cm   Wound Width (cm) 7.1 cm   Wound Depth (cm) 0.1 cm   Wound Surface Area (cm^2) 7.1 cm^2   Change in Wound Size % 0   Wound Volume (cm^3) 0.71 cm^3   Wound Healing % 50   Drainage Amount Moderate   Drainage Description Serosanguinous   Wound Odor None   Mika-Wound/Incision Assessment Fragile   Wound Thickness Description Full thickness   Wound #3 Perianal 04/28/22   Date First Assessed/Time First Assessed: 04/28/22 1543   Present on Hospital Admission: Yes  Wound Approximate Age at First Assessment (Weeks): (c)   Primary Wound Type:  Other (comment)  Wound Description: #3 Perianal  Date of First Observation: 04/28/22   Wound Image    Dressing Status Old drainage noted   Cleansed Cleansed with saline   Dressing/Treatment Alginate with Ag   Wound Length (cm) 1.2 cm   Wound Width (cm) 0.3 cm   Wound Depth (cm) 0.2 cm   Wound Surface Area (cm^2) 0.36 cm^2   Change in Wound Size % 94   Wound Volume (cm^3) 0.072 cm^3   Wound Healing % 94   Wound Assessment Granulation tissue   Drainage Amount Moderate   Drainage Description Serosanguinous   Wound Odor None   Carri-Wound/Incision Assessment Intact   Wound Thickness Description Full thickness

## 2022-05-11 ENCOUNTER — TELEPHONE (OUTPATIENT)
Dept: WOUND CARE | Age: 67
End: 2022-05-11

## 2022-05-11 NOTE — TELEPHONE ENCOUNTER
RN with [facility] called to state that every time she visits patient for dressing change she completes dressing change and patient states she's going to do it again later.  RN asks for maybe after next visit to drop back on [facility] visits to 2 times a week instead of 3 times per week since patient is completing dressing on her own and performing them correctly. Will assess at next visit.

## 2022-06-02 ENCOUNTER — HOSPITAL ENCOUNTER (OUTPATIENT)
Dept: WOUND CARE | Age: 67
Setting detail: RECURRING SERIES
Discharge: HOME | End: 2022-06-02
Payer: MEDICARE

## 2022-06-02 VITALS
WEIGHT: 150 LBS | BODY MASS INDEX: 23.54 KG/M2 | HEIGHT: 67 IN | DIASTOLIC BLOOD PRESSURE: 93 MMHG | TEMPERATURE: 98.4 F | RESPIRATION RATE: 18 BRPM | SYSTOLIC BLOOD PRESSURE: 126 MMHG | HEART RATE: 70 BPM

## 2022-06-02 PROCEDURE — 99214 OFFICE O/P EST MOD 30 MIN: CPT | Performed by: SURGERY

## 2022-06-02 RX ORDER — ALBUTEROL SULFATE 90 UG/1
2 AEROSOL, METERED RESPIRATORY (INHALATION) EVERY 6 HOURS PRN
COMMUNITY
Start: 2018-05-08

## 2022-06-02 RX ORDER — ALBUTEROL SULFATE 2.5 MG/3ML
2.5 SOLUTION RESPIRATORY (INHALATION) 4 TIMES DAILY
COMMUNITY

## 2022-06-02 RX ORDER — LIDOCAINE 40 MG/G
CREAM TOPICAL ONCE
Status: CANCELLED | OUTPATIENT
Start: 2022-06-02 | End: 2022-06-02

## 2022-06-02 RX ORDER — LIDOCAINE HYDROCHLORIDE 20 MG/ML
JELLY TOPICAL ONCE
Status: CANCELLED | OUTPATIENT
Start: 2022-06-02 | End: 2022-06-02

## 2022-06-02 RX ORDER — BETAMETHASONE DIPROPIONATE 0.05 %
OINTMENT (GRAM) TOPICAL ONCE
Status: CANCELLED | OUTPATIENT
Start: 2022-06-02 | End: 2022-06-02

## 2022-06-02 RX ORDER — OXYCODONE AND ACETAMINOPHEN 7.5; 325 MG/1; MG/1
7.5 TABLET ORAL EVERY 6 HOURS PRN
COMMUNITY
Start: 2022-05-06

## 2022-06-02 RX ORDER — LIDOCAINE HYDROCHLORIDE 40 MG/ML
SOLUTION TOPICAL ONCE
Status: CANCELLED | OUTPATIENT
Start: 2022-06-02 | End: 2022-06-02

## 2022-06-02 RX ORDER — BACITRACIN, NEOMYCIN, POLYMYXIN B 400; 3.5; 5 [USP'U]/G; MG/G; [USP'U]/G
OINTMENT TOPICAL ONCE
Status: CANCELLED | OUTPATIENT
Start: 2022-06-02 | End: 2022-06-02

## 2022-06-02 RX ORDER — LISINOPRIL 10 MG/1
10 TABLET ORAL DAILY
COMMUNITY
Start: 2018-01-03

## 2022-06-02 RX ORDER — BACITRACIN ZINC AND POLYMYXIN B SULFATE 500; 1000 [USP'U]/G; [USP'U]/G
OINTMENT TOPICAL ONCE
Status: CANCELLED | OUTPATIENT
Start: 2022-06-02 | End: 2022-06-02

## 2022-06-02 RX ORDER — CLOBETASOL PROPIONATE 0.5 MG/G
OINTMENT TOPICAL ONCE
Status: CANCELLED | OUTPATIENT
Start: 2022-06-02 | End: 2022-06-02

## 2022-06-02 RX ORDER — GINSENG 100 MG
CAPSULE ORAL ONCE
Status: CANCELLED | OUTPATIENT
Start: 2022-06-02 | End: 2022-06-02

## 2022-06-02 RX ORDER — LIDOCAINE 50 MG/G
OINTMENT TOPICAL ONCE
Status: CANCELLED | OUTPATIENT
Start: 2022-06-02 | End: 2022-06-02

## 2022-06-02 RX ORDER — GENTAMICIN SULFATE 1 MG/G
OINTMENT TOPICAL ONCE
Status: CANCELLED | OUTPATIENT
Start: 2022-06-02 | End: 2022-06-02

## 2022-06-02 ASSESSMENT — PAIN DESCRIPTION - LOCATION: LOCATION: SACRUM;ABDOMEN

## 2022-06-02 ASSESSMENT — PAIN SCALES - GENERAL: PAINLEVEL_OUTOF10: 8

## 2022-06-02 ASSESSMENT — PAIN DESCRIPTION - DESCRIPTORS: DESCRIPTORS: ACHING

## 2022-06-02 NOTE — FLOWSHEET NOTE
06/02/22 1336   Wound 04/28/22 Abdomen georgia-umbilicus   Date First Assessed/Time First Assessed: 04/28/22 1507   Present on Hospital Admission: Yes  Wound Approximate Age at First Assessment (Weeks): 6 weeks  Primary Wound Type: (c) Other (comment)  Location: Abdomen  Wound Description (Comments): georgia-umb. .. Wound Image    Dressing Status Clean;Dry; Intact   Wound Cleansed Soap and water   Wound Length (cm) 0 cm   Wound Width (cm) 0 cm   Wound Depth (cm) 0 cm   Wound Surface Area (cm^2) 0 cm^2   Wound Volume (cm^3) 0 cm^3   Wound Assessment Epithelialization   Drainage Amount None   Odor None   Wound 04/28/22 Sacrum #1 sacral;coccyx;pilonidal   Date First Assessed/Time First Assessed: 04/28/22 1510   Present on Hospital Admission: Yes  Primary Wound Type: Other (comment)  Location: Sacrum  Wound Description (Comments): #1 sacral;coccyx;pilonidal   Wound Image    Dressing Status Old drainage noted   Wound Cleansed Soap and water   Dressing/Treatment Alginate;Gauze dressing/dressing sponge   Wound Length (cm) 1.2 cm   Wound Width (cm) 0.3 cm   Wound Depth (cm) 0.1 cm   Wound Surface Area (cm^2) 0.36 cm^2   Wound Volume (cm^3) 0.036 cm^3   Wound Assessment Pink/red   Drainage Amount Scant   Drainage Description Serosanguinous   Odor None   Wound Thickness Description not for Pressure Injury Full thickness   Wound 04/28/22 Other (Comment) #3 perianal   Date First Assessed/Time First Assessed: 04/28/22 1543   Present on Hospital Admission: Yes  Primary Wound Type: Other (comment)  Location: Other (Comment)  Wound Description (Comments): #3 perianal   Wound Image    Dressing Status Clean; Intact;Dry   Wound Cleansed Cleansed with saline   Dressing/Treatment Alginate;Gauze dressing/dressing sponge   Wound Length (cm) 1.5 cm   Wound Width (cm) 0.2 cm   Wound Depth (cm) 0.1 cm   Wound Surface Area (cm^2) 0.3 cm^2   Wound Volume (cm^3) 0.03 cm^3   Wound Assessment Pink/red   Drainage Amount Small   Drainage Description Serosanguinous   Odor None   Wound Thickness Description not for Pressure Injury Full thickness

## 2022-06-02 NOTE — WOUND CARE
Discharge Instructions for  [facility]  [address] [place] [address]  [address], [address]  Phone [phone]   Fax [phone]      NAME:  Pastor Downing  YOB: 1955  MEDICAL RECORD NUMBER:  [id number]  DATE:  6/2/2022    Return Appointment:   1 month with Carmen Corado MD      Instructions: Abdominal wound is healed! Sacral and Perianal wounds:  Nizoral Shampoo - wash entire body 1 time per day for 1 week; then once per week; use Dove soap on other days. Apply zinc oxide paste/Desitin to open areas daily  Secure with undergarment.     Discontinue Home health at this time; patient is independent with wound care      Should you experience increased redness, swelling, pain, foul odor, size of wound(s), or have a temperature over 101 degrees please contact the [address] [place] [address] Road at [phone] or if after hours contact your primary care physician or go to the hospital emergency department. PLEASE NOTE: IF YOU ARE UNABLE TO OBTAIN WOUND SUPPLIES, CONTINUE TO USE THE SUPPLIES YOU HAVE AVAILABLE UNTIL YOU ARE ABLE TO REACH US. IT IS MOST IMPORTANT TO KEEP THE WOUND COVERED AT ALL TIMES.     Electronically signed Radha Roth PT, [facility] [facility] on 6/2/2022 at 1:53 PM

## 2022-06-03 NOTE — PROGRESS NOTES
wound:  Location:  Gluteal cleft, periumbilical  Duration: Chronic for many years  Severity : Waxing and waning severity   Context: Surgically treated pilonidal disease, periumbilical rash   Modifying Factors:  Nothing makes better or worse   Quality: Painful   Timing: Often  Associated Signs and Symptoms : Skin breakdown, irritation, open wounds         Ulcer Identification:   Ulcer Type: non-healing surgical, undetermined, pilonidal cyst and Rash      Contributing Factors: Deep cleft, overtreatment with antibacterials        PAST MEDICAL HISTORY  Past Medical History:   Diagnosis Date    Arthritis     osteo    Cataract     left eye    Chronic kidney disease     renal calc.  Chronic pain     joint    Gastrointestinal disorder     ulcer    GERD (gastroesophageal reflux disease)     takes occassional OTC meds    Hypertension     controlled by meds    Kidney stones     Other ill-defined conditions(799.89)     \"has no sweat glands\", sickle  cell    Sickle cell disease ([facility] [address]     last crisis 4/14, well controlled per pt. PAST SURGICAL HISTORY  Past Surgical History:   Procedure Laterality Date    CHOLECYSTECTOMY  2005    GI  2005,2006    hiatal hernia x 2    HYSTERECTOMY  1999    ROYER    OTHER SURGICAL HISTORY      groin, buttocks, axilla- sweat glands    UROLOGICAL SURGERY   2009x23/2010    cyto,stentx3       FAMILY HISTORY  Family History   Problem Relation Age of Onset    Heart Disease Sister     Heart Disease Brother     Lung Disease Brother     Hypertension Sister     Heart Disease Brother     Lung Disease Brother     Alcohol Abuse Neg Hx     Allergy (Severe) Neg Hx     Anemia Neg Hx     Asthma Neg Hx     Cancer Neg Hx     Depression Neg Hx     Heart Attack Neg Hx     Kidney Disease Neg Hx     Mental Retardation Neg Hx     Rashes/Skin Problems Neg Hx     Malig Hypertherm Neg Hx     Pseudochol.  Deficiency Neg Hx     Delayed Awakening Neg Hx     Post-op Nausea/Vomiting Neg Hx  Emergence Delirium Neg Hx     Post-op Cognitive Dysfunction Neg Hx     Other Neg Hx     Diabetes Mother     Hypertension Mother     Hypertension Brother     Diabetes Brother        SOCIAL HISTORY  Social History     Tobacco Use    Smoking status: Never Smoker    Smokeless tobacco: Never Used   Substance Use Topics    Alcohol use: No    Drug use: No       ALLERGIES  Allergies   Allergen Reactions    Clonidine Hives     Other reaction(s): Hives/Swelling-A      Amlodipine      Other reaction(s): Drowsiness-Intolerance  Other reaction(s): Drowsiness-I      Tramadol Rash    Trazodone Rash       MEDICATIONS  Current Outpatient Medications on File Prior to Encounter   Medication Sig Dispense Refill    albuterol sulfate  (90 Base) MCG/ACT inhaler Inhale 2 puffs into the lungs every 6 hours as needed      lisinopril (PRINIVIL;ZESTRIL) 10 MG tablet Take 10 mg by mouth daily      albuterol (PROVENTIL) (2.5 MG/3ML) 0.083% nebulizer solution Inhale 2.5 mg into the lungs 4 times daily      oxyCODONE-acetaminophen (PERCOCET) 7.5-325 MG per tablet Take 7.5 tablets by mouth every 6 hours as needed.  LORazepam (ATIVAN) 1 MG tablet Take by mouth 3 times daily. No current facility-administered medications on file prior to encounter. REVIEW OF SYSTEMS  The patient has no difficulty with chest pain or shortness of breath. No fever or chills. Comprehensive review of systems was otherwise unremarkable except as noted above.       Objective:   Physical Exam:   Recent vitals (if inpt):  Patient Vitals for the past 24 hrs:   BP Temp Temp src Pulse Resp Height Weight   06/02/22 1318 (!) 126/93 98.4 °F (36.9 °C) Oral 70 18 5' 7\" (1.702 m) 150 lb (68 kg)       BP (!) 126/93   Pulse 70   Temp 98.4 °F (36.9 °C) (Oral)   Resp 18   Ht 5' 7\" (1.702 m)   Wt 150 lb (68 kg)   BMI 23.49 kg/m²   Wt Readings from Last 3 Encounters:   06/02/22 150 lb (68 kg)     Constitutional: Alert, oriented, cooperative patient in no acute distress; appears stated age;  General appearance is within normal limits for wound care patient population. See the today's recorded vitals signs and constitutional data. Eyes: Pupils equal; Sclera are clear. EOMs intact; The eyes appear to track and move normally. The sclera are not injected. The conjunctive are clear. The eyelids are normal. There is no scleral icterus. ENMT: There are no obvious external ear, nose, lip or mouth lesions. Nares normal; Neck: Overall contour of the neck is normal with no obvious neck masses. Gross hearing is within normal limits. No obvious neck masses  Resp:  Breathing is non-labored; normal rate and effort; no audible wheezing. CV: RRR;  no JVD; No evidence of cyanosis of the upper extremities. The extremities are perfused without embolic sign, splinter hemorrhages, or petechia. GI: soft and non-distended without acute abnormality noted. Musculoskeletal: unremarkable with normal function. No embolic signs or cyanosis. Neuro:  Oriented; moves all 4; no focal deficits  Psychiatric: Judgement and insight are within normal limits for the wound care population of patients. Patient is oriented to person, place, and time. Recent and remote memory are within normal limits. Mood and affect are within normal limits. Integumentary (Skin/Wounds)  See inspection of wound(s) below. There are no other skin areas of palpable concern. See wound center documentation including photos in the [address] Wound Template made part of this record by reference. Wound Care Documentation:    Wound 04/28/22 Abdomen georgia-umbilicus (Active)   Wound Image   06/02/22 1336   Dressing Status Clean;Dry; Intact 06/02/22 1336   Wound Cleansed Soap and water 06/02/22 1336   Wound Length (cm) 0 cm 06/02/22 1336   Wound Width (cm) 0 cm 06/02/22 1336   Wound Depth (cm) 0 cm 06/02/22 1336   Wound Surface Area (cm^2) 0 cm^2 06/02/22 1336   Wound Volume (cm^3) 0 cm^3 06/02/22 1336   Wound Assessment Epithelialization 06/02/22 1336   Drainage Amount None 06/02/22 1336   Odor None 06/02/22 1336   Number of days: 35       Wound 04/28/22 Sacrum #1 sacral;coccyx;pilonidal (Active)   Wound Image   06/02/22 1336   Dressing Status Old drainage noted 06/02/22 1336   Wound Cleansed Soap and water 06/02/22 1336   Dressing/Treatment Alginate;Gauze dressing/dressing sponge 06/02/22 1336   Wound Length (cm) 1.2 cm 06/02/22 1336   Wound Width (cm) 0.3 cm 06/02/22 1336   Wound Depth (cm) 0.1 cm 06/02/22 1336   Wound Surface Area (cm^2) 0.36 cm^2 06/02/22 1336   Wound Volume (cm^3) 0.036 cm^3 06/02/22 1336   Wound Assessment Pink/red 06/02/22 1336   Drainage Amount Scant 06/02/22 1336   Drainage Description Serosanguinous 06/02/22 1336   Odor None 06/02/22 1336   Wound Thickness Description not for Pressure Injury Full thickness 06/02/22 1336   Number of days: 35       Wound 04/28/22 Other (Comment) #3 perianal (Active)   Wound Image   06/02/22 1336   Dressing Status Clean; Intact;Dry 06/02/22 1336   Wound Cleansed Cleansed with saline 06/02/22 1336   Dressing/Treatment Alginate;Gauze dressing/dressing sponge 06/02/22 1336   Wound Length (cm) 1.5 cm 06/02/22 1336   Wound Width (cm) 0.2 cm 06/02/22 1336   Wound Depth (cm) 0.1 cm 06/02/22 1336   Wound Surface Area (cm^2) 0.3 cm^2 06/02/22 1336   Wound Volume (cm^3) 0.03 cm^3 06/02/22 1336   Wound Assessment Pink/red 06/02/22 1336   Drainage Amount Small 06/02/22 1336   Drainage Description Serosanguinous 06/02/22 1336   Odor None 06/02/22 1336   Wound Thickness Description not for Pressure Injury Full thickness 06/02/22 1336   Number of days: 35                    Initial wounds 4/28/2022       Amount and/or Complexity of Data Reviewed and Analyzed:  I reviewed and analyzed all of the unique labs and radiologic studies that are shown below as well as any that are in the HPI, and any that are in the expanded problem list 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 Pilonidal disease  Chronic dermatitis (lichen planus)      Presents to the wound center on 4/28/2022, after being seen in the ER 1 day prior. She had 2 complaints. She has a chronic periumbilical dermatitis that had typically responded to Bactroban that began developing cracks. She was told to continue this  treatment. She also was noted to have evidence of pilonidal type irritation and was also advised to use Bactroban. She uses Dial soap. She has history of hidradenitis treated in both axillae. She is currently permanently disabled from that condition. I think that her periumbilical dermatitis may actually be fungal in origin from overtreatment. I am recommending that she begin to shower with Nizoral shampoo as an antifungal agent. We will monitor this process and for not to use any more Bactroban. With regards to her pilonidal disease. She had extensive pilonidal cystectomy by Dr. Dedrick Alvarez many years ago. She has a very deep gluteal cleft. Disease has reopened and is an ulcerated wound with 2 areas. She is not hirsute. She may be a candidate  for cleft lift procedure when we get better control of her dermatitis. For now we will dress the pilonidal area with Aquacel Ag. We will institute these first 2 treatments and see her back in 1 week. She returns on 5/5/2022. She has seen a dermatologist  in the interim who gave her a prescription for Diflucan. I have asked her still to hold off on the use of Bactroban and any harsh body washes.   I would like her to continue to use the Nizoral shampoo as a body wash. The skin appears to be clearing up  especially the gluteal region. The chronic dermatitis at the periumbilical region has been classified as lichen planus on older notes. I think this will be very hard to control. I think as she continues to improve we can consider cleft lift flap reconstruction  of the gluteal cleft. We will need to make sure that she is medically ready for this procedure. Her medical condition is still unknown. We will institute Desitin to the abdomen and continue with silver alginate to the pilonidal area. She returns on 6/2/2022. She has improved with antifungal treatment. She is using Nizoral shampoo as a body wash 2 times per day and we will decrease to 1 time a bit day for another 2 weeks and then use Dove and Nizoral 1 time per week as maintenance. She will use a light coating of Desitin to the perianal intergluteal and periumbilical areas. I will have her  come back in 4 weeks. Wound Care  No orders of the defined types were placed in this encounter. Return Appointment:   1 month with Myron Alfaro MD        Instructions: Abdominal wound is healed! Sacral and Perianal wounds:  Nizoral Shampoo - wash entire body 1 time per day for 1 week; then once per week; use Dove soap on other days. Apply zinc oxide paste/Desitin to open areas daily  Secure with undergarment.     Discontinue Home health at this time; patient is independent with wound care    Level of MDM (Based on 2/3 elements below)  Number and Complexity of Problems Addressed Amount and/or Complexity of Data to be Reviewed and Analyzed  *Each unique test, order, or document contributes to the combination of 2 or combination of 3 in Category 1 below.  Risk of Complications and/or Morbidity or Mortality of pt Management     74401  59970 SF Minimal  1self-limited or minor problem Minimal or none Minimal risk of morbidity from additional diagnostic testing or Rx 46911  09615 Low Low  2or more self-limited or minor problems;    or  1stable chronic illness;    or  7PSQBK, uncomplicated illness or injury   Limited  (Must meet the requirements of at least 1 of the 2 categories)  Category 1: Tests and documents   Any combination of 2 from the following:  Review of prior external note(s) from each unique source*;  review of the result(s) of each unique test*;   ordering of each unique test*    or   Category 2: Assessment requiring an independent historian(s)  (For the categories of independent interpretation of tests and discussion of management or test interpretation, see moderate or high) Low risk of morbidity from additional diagnostic testing or treatment     84873  07923 Mod Moderate  1or more chronic illnesses with exacerbation, progression, or side effects of treatment;    or  2or more stable chronic illnesses;    or      1undiagnosed new problem with uncertain prognosis;    or  1acute illness with systemic symptoms;    or  4[facility] complicated injury   Moderate:  (Must meet the requirements of 1 out of 3 categories)    Category 1: Tests, documents, or independent historian(s)  Any combination of 3 from the following:   Review of prior external note(s) from each unique source*;  Review of the result(s) of each unique test*;  Ordering of each unique test*;  Assessment requiring an independent historian(s)    or  Category 2: Independent interpretation of tests   Independent interpretation of a test performed by another physician/other qualified health care professional (not separately reported);     or  Category 3: Discussion of management or test interpretation  Discussion of management or test interpretation with external physician/other qualified health care professional/appropriate source (not separately reported)   Moderate risk of morbidity from additional diagnostic testing or treatment  Examples only:  Prescription drug management - including advanced wound care prescription wound care products  (eg Iodosorb, hydrofera, silvadene, collagen, puracol plus, optiloc, biologics - placenta, Theraskin, Apligraf).   Note also that if home health care is involved, they will not apply topical therapies without a prescription/order from physician    [address] [place] [address] regarding minor surgery with identified patient or procedure risk factors  Decision regarding elective major surgery without identified patient or procedure risk factors   Diagnosis or treatment significantly limited by social determinants of health       [id number] High High  1or more chronic illnesses with severe exacerbation, progression, or side effects of treatment;    or  1 acute or chronic illness or injury that poses a threat to life or bodily function   Extensive  (Must meet the requirements of at least 2 out of 3 categories)    Category 1: Tests, documents, or independent historian(s)  Any combination of 3 from the following:   Review of prior external note(s) from each unique source*;  Review of the result(s) of each unique test*;   Ordering of each unique test*;   Assessment requiring an independent historian(s)    or   Category 2: Independent interpretation of tests   Independent interpretation of a test performed by another physician/other qualified health care professional (not separately reported);     or  Category 3: Discussion of management or test interpretation  Discussion of management or test interpretation with external physician/other qualified health care professional/appropriate source (not separately reported)   High risk of morbidity from additional diagnostic testing or treatment  Examples only:  Drug therapy requiring intensive monitoring for toxicity  Decision regarding elective major surgery with identified patient or procedure risk factors  Decision regarding emergency major surgery  Decision regarding hospitalization  Decision not to resuscitate or to de-escalate care because of poor prognosis         Electronically signed by Errol Jacome MD on 6/2/2022 at 10:45 PM

## 2022-07-07 ENCOUNTER — HOSPITAL ENCOUNTER (OUTPATIENT)
Dept: WOUND CARE | Age: 67
Setting detail: RECURRING SERIES
Discharge: HOME | End: 2022-07-07
Payer: MEDICARE

## 2022-07-07 VITALS
SYSTOLIC BLOOD PRESSURE: 130 MMHG | WEIGHT: 153 LBS | HEART RATE: 65 BPM | DIASTOLIC BLOOD PRESSURE: 104 MMHG | BODY MASS INDEX: 24.01 KG/M2 | TEMPERATURE: 97.6 F | HEIGHT: 67 IN | RESPIRATION RATE: 18 BRPM

## 2022-07-07 DIAGNOSIS — L30.9 CHRONIC DERMATITIS: ICD-10-CM

## 2022-07-07 DIAGNOSIS — L98.8 PILONIDAL DISEASE: Primary | ICD-10-CM

## 2022-07-07 PROCEDURE — 99212 OFFICE O/P EST SF 10 MIN: CPT

## 2022-07-07 PROCEDURE — 99213 OFFICE O/P EST LOW 20 MIN: CPT | Performed by: SURGERY

## 2022-07-07 RX ORDER — LIDOCAINE HYDROCHLORIDE 40 MG/ML
SOLUTION TOPICAL ONCE
Status: CANCELLED | OUTPATIENT
Start: 2022-07-07 | End: 2022-07-07

## 2022-07-07 RX ORDER — LIDOCAINE HYDROCHLORIDE 20 MG/ML
JELLY TOPICAL ONCE
Status: CANCELLED | OUTPATIENT
Start: 2022-07-07 | End: 2022-07-07

## 2022-07-07 RX ORDER — DAPAGLIFLOZIN 10 MG/1
TABLET, FILM COATED ORAL
COMMUNITY
Start: 2022-06-27

## 2022-07-07 RX ORDER — GENTAMICIN SULFATE 1 MG/G
OINTMENT TOPICAL ONCE
Status: CANCELLED | OUTPATIENT
Start: 2022-07-07 | End: 2022-07-07

## 2022-07-07 RX ORDER — BETAMETHASONE DIPROPIONATE 0.05 %
OINTMENT (GRAM) TOPICAL ONCE
Status: CANCELLED | OUTPATIENT
Start: 2022-07-07 | End: 2022-07-07

## 2022-07-07 RX ORDER — GINSENG 100 MG
CAPSULE ORAL ONCE
Status: CANCELLED | OUTPATIENT
Start: 2022-07-07 | End: 2022-07-07

## 2022-07-07 RX ORDER — LIDOCAINE 50 MG/G
OINTMENT TOPICAL ONCE
Status: CANCELLED | OUTPATIENT
Start: 2022-07-07 | End: 2022-07-07

## 2022-07-07 RX ORDER — OLOPATADINE HYDROCHLORIDE 1 MG/ML
SOLUTION/ DROPS OPHTHALMIC
COMMUNITY
Start: 2022-07-06

## 2022-07-07 RX ORDER — OMEPRAZOLE 40 MG/1
40 CAPSULE, DELAYED RELEASE ORAL DAILY
COMMUNITY
Start: 2022-06-27

## 2022-07-07 RX ORDER — LIDOCAINE 40 MG/G
CREAM TOPICAL ONCE
Status: CANCELLED | OUTPATIENT
Start: 2022-07-07 | End: 2022-07-07

## 2022-07-07 RX ORDER — CLOBETASOL PROPIONATE 0.5 MG/G
OINTMENT TOPICAL ONCE
Status: CANCELLED | OUTPATIENT
Start: 2022-07-07 | End: 2022-07-07

## 2022-07-07 RX ORDER — BACITRACIN, NEOMYCIN, POLYMYXIN B 400; 3.5; 5 [USP'U]/G; MG/G; [USP'U]/G
OINTMENT TOPICAL ONCE
Status: CANCELLED | OUTPATIENT
Start: 2022-07-07 | End: 2022-07-07

## 2022-07-07 RX ORDER — BACITRACIN ZINC AND POLYMYXIN B SULFATE 500; 1000 [USP'U]/G; [USP'U]/G
OINTMENT TOPICAL ONCE
Status: CANCELLED | OUTPATIENT
Start: 2022-07-07 | End: 2022-07-07

## 2022-07-07 NOTE — WOUND CARE
Discharge Instructions for  [facility]  [address] [place] [address]  [address], [address] [facility]  Phone [phone]   Fax [phone]      NAME:  Patel Sam  YOB: 1955  MEDICAL RECORD NUMBER:  [id number]  DATE:  7/7/2022    Return Appointment:   Discharge with Toni Briggs MD      Instructions: Wounds are healed! No dressing needed. Should you experience increased redness, swelling, pain, foul odor, size of wound(s), or have a temperature over 101 degrees please contact the [address] [place] [address] Road at [phone] or if after hours contact your primary care physician or go to the hospital emergency department. PLEASE NOTE: IF YOU ARE UNABLE TO OBTAIN WOUND SUPPLIES, CONTINUE TO USE THE SUPPLIES YOU HAVE AVAILABLE UNTIL YOU ARE ABLE TO REACH US. IT IS MOST IMPORTANT TO KEEP THE WOUND COVERED AT ALL TIMES.     Electronically signed Kareem Cifuentes RN on 7/7/2022 at 1:35 PM

## 2022-07-07 NOTE — FLOWSHEET NOTE
07/07/22 1319   Wound 04/28/22 Sacrum #1 sacral;coccyx;pilonidal   Date First Assessed/Time First Assessed: 04/28/22 1510   Present on Hospital Admission: Yes  Primary Wound Type: Other (comment)  Location: Sacrum  Wound Description (Comments): #1 sacral;coccyx;pilonidal   Wound Image    Wound Cleansed Cleansed with saline   Dressing/Treatment Open to air   Wound Length (cm) 0 cm   Wound Width (cm) 0 cm   Wound Depth (cm) 0 cm   Wound Surface Area (cm^2) 0 cm^2   Change in Wound Size % (l*w) 100   Wound Volume (cm^3) 0 cm^3   Wound Healing % 100   Wound Assessment Epithelialization   Drainage Amount None   Odor None   Wound 04/28/22 Other (Comment) #3 perianal   Date First Assessed/Time First Assessed: 04/28/22 1543   Present on Hospital Admission: Yes  Primary Wound Type:  Other (comment)  Location: Other (Comment)  Wound Description (Comments): #3 perianal   Wound Image    Wound Cleansed Cleansed with saline   Dressing/Treatment Open to air   Wound Length (cm) 0 cm   Wound Width (cm) 0 cm   Wound Depth (cm) 0 cm   Wound Surface Area (cm^2) 0 cm^2   Change in Wound Size % (l*w) 100   Wound Volume (cm^3) 0 cm^3   Wound Healing % 100   Wound Assessment Epithelialization   Drainage Amount None   Odor None

## 2022-07-07 NOTE — PROGRESS NOTES
[address]  Consult Note/H&P/Progress Note      [address] RECORD NUMBER:  [id number]  AGE: 69 y.o. RACE Black /   GENDER: female  : 1955  EPISODE DATE:  2022       Subjective:      CC (Chief Complaint) and HISTORY of PRESENT ILLNESS (HPI):    Eliezer Dandy is a 69 y.o. female who presents today for wound/ulcer evaluation. She was seen in the ER 1 day prior to her initial visit to the wound center on 2022. She was noted to have open wounds from prior pilonidal  cystectomy performed by Dr. Baljinder Rodríguez approximately 20 years ago. She has chronic wounds that open and close with some response to Bactroban. She also has a chronic periumbilical rash that has developed cracks. She also treats this with Bactroban. She  washes with Dial soap. She has a history of hidradenitis of both axilla which were treated surgically. She is currently and has been permanently disabled for this condition. She also has a history of hepatitis C that has been treated and eradicated. She is never smoker. She is a never drinker. She returns on 2022. She went to a dermatologist who also gave her a prescription for fluconazole. She is using the Nizoral shampoo as a body wash. It is unclear whether she is also placing mupirocin on the umbilical area as well. The periumbilical  area appears to be very chronic and notes show that this dates back many years. It has been called lichen planus. I am not sure how much we will get this to calm down. The recurrent pilonidal disease periwound irritation is calming down also. It is  being dressed with silver alginate and improving. She was seen on 2022 with improvement and no reason to consider any surgical excision. She returns on 2022. Her pilonidal area wound is completely closed.   The dermatitis surrounding her umbilicus is back to a hyperpigmented baseline without any open areas. She has responded well to antifungal treatment. It appears like she is putting on some Vaseline but we again recommended using Desitin as a thin barrier. This information was obtained from the patient  The following HPI elements were documented for the patient's wound:  Location:  Gluteal cleft, periumbilical  Duration: Chronic for many years  Severity : Waxing and waning severity   Context: Surgically treated pilonidal disease, periumbilical rash   Modifying Factors:  Nothing makes better or worse   Quality: Painful   Timing: Often  Associated Signs and Symptoms : Skin breakdown, irritation, open wounds         Ulcer Identification:   Ulcer Type: non-healing surgical, undetermined, pilonidal cyst and Rash      Contributing Factors: Deep cleft, overtreatment with antibacterials        PAST MEDICAL HISTORY  Past Medical History:   Diagnosis Date    Arthritis     osteo    Cataract     left eye    Chronic kidney disease     renal calc.  Chronic pain     joint    Gastrointestinal disorder     ulcer    GERD (gastroesophageal reflux disease)     takes occassional OTC meds    Hypertension     controlled by meds    Kidney stones     Other ill-defined conditions(799.89)     \"has no sweat glands\", sickle  cell    Sickle cell disease ([facility] [address]     last crisis 4/14, well controlled per pt.         PAST SURGICAL HISTORY  Past Surgical History:   Procedure Laterality Date    CHOLECYSTECTOMY  2005    GI  2005,2006    hiatal hernia x 2    HYSTERECTOMY (CERVIX STATUS UNKNOWN)  1999    ROYER    OTHER SURGICAL HISTORY      groin, buttocks, axilla- sweat glands    UROLOGICAL SURGERY   2009x23/2010    cyto,stentx3       FAMILY HISTORY  Family History   Problem Relation Age of Onset    Heart Disease Sister     Heart Disease Brother     Lung Disease Brother     Hypertension Sister     Heart Disease Brother     Lung Disease Brother     Alcohol Abuse Neg Hx     Allergy (Severe) Neg Hx     Anemia Neg Hx     Asthma Neg Hx     Cancer Neg Hx     Depression Neg Hx     Heart Attack Neg Hx     Kidney Disease Neg Hx     Mental Retardation Neg Hx     Rashes/Skin Problems Neg Hx     Malig Hypertherm Neg Hx     Pseudochol. Deficiency Neg Hx     Delayed Awakening Neg Hx     Post-op Nausea/Vomiting Neg Hx     Emergence Delirium Neg Hx     Post-op Cognitive Dysfunction Neg Hx     Other Neg Hx     Diabetes Mother     Hypertension Mother     Hypertension Brother     Diabetes Brother        SOCIAL HISTORY  Social History     Tobacco Use    Smoking status: Never Smoker    Smokeless tobacco: Never Used   Substance Use Topics    Alcohol use: No    Drug use: No       ALLERGIES  Allergies   Allergen Reactions    Clonidine Hives     Other reaction(s): Hives/Swelling-A      Amlodipine      Other reaction(s): Drowsiness-Intolerance  Other reaction(s): Drowsiness-I      Tramadol Rash    Trazodone Rash       MEDICATIONS  Current Outpatient Medications on File Prior to Encounter   Medication Sig Dispense Refill    omeprazole (PRILOSEC) 40 MG delayed release capsule Take 40 mg by mouth daily      FARXIGA 10 MG tablet TAKE 1 TABLET BY MOUTH DAILY      olopatadine (PATANOL) 0.1 % ophthalmic solution       albuterol sulfate  (90 Base) MCG/ACT inhaler Inhale 2 puffs into the lungs every 6 hours as needed      albuterol (PROVENTIL) (2.5 MG/3ML) 0.083% nebulizer solution Inhale 2.5 mg into the lungs 4 times daily      lisinopril (PRINIVIL;ZESTRIL) 10 MG tablet Take 10 mg by mouth daily      oxyCODONE-acetaminophen (PERCOCET) 7.5-325 MG per tablet Take 7.5 tablets by mouth every 6 hours as needed.  LORazepam (ATIVAN) 1 MG tablet Take by mouth 3 times daily. No current facility-administered medications on file prior to encounter. REVIEW OF SYSTEMS  The patient has no difficulty with chest pain or shortness of breath. No fever or chills.     Comprehensive review of systems was otherwise unremarkable except as noted above. Objective:   Physical Exam:   Recent vitals (if inpt):  Patient Vitals for the past 24 hrs:   BP Temp Temp src Pulse Resp Height Weight   07/07/22 1325 -- -- -- -- -- 5' 7\" (1.702 m) 153 lb (69.4 kg)   07/07/22 1313 (!) 130/104 97.6 °F (36.4 °C) Temporal 65 18 -- --       BP (!) 130/104   Pulse 65   Temp 97.6 °F (36.4 °C) (Temporal)   Resp 18   Ht 5' 7\" (1.702 m)   Wt 153 lb (69.4 kg)   BMI 23.96 kg/m²   Wt Readings from Last 3 Encounters:   07/07/22 153 lb (69.4 kg)   06/02/22 150 lb (68 kg)     Constitutional: Alert, oriented, cooperative patient in no acute distress; appears stated age;  General appearance is within normal limits for wound care patient population. See the today's recorded vitals signs and constitutional data. Eyes: Pupils equal; Sclera are clear. EOMs intact; The eyes appear to track and move normally. The sclera are not injected. The conjunctive are clear. The eyelids are normal. There is no scleral icterus. ENMT: There are no obvious external ear, nose, lip or mouth lesions. Nares normal; Neck: Overall contour of the neck is normal with no obvious neck masses. Gross hearing is within normal limits. No obvious neck masses  Resp:  Breathing is non-labored; normal rate and effort; no audible wheezing. CV: RRR;  no JVD; No evidence of cyanosis of the upper extremities. The extremities are perfused without embolic sign, splinter hemorrhages, or petechia. GI: soft and non-distended without acute abnormality noted. Musculoskeletal: unremarkable with normal function. No embolic signs or cyanosis. Neuro:  Oriented; moves all 4; no focal deficits  Psychiatric: Judgement and insight are within normal limits for the wound care population of patients. Patient is oriented to person, place, and time. Recent and remote memory are within normal limits. Mood and affect are within normal limits.      Integumentary (Skin/Wounds)  See inspection of wound(s) below. There are no other skin areas of palpable concern. See wound center documentation including photos in the [facility] [address] Wound Template made part of this record by reference. Wound Care Documentation:        Closed wounds 7/7/2022                  Initial wounds 4/28/2022     Amount and/or Complexity of Data Reviewed and Analyzed:  I reviewed and analyzed all of the unique labs and radiologic studies that are shown below as well as any that are in the HPI, and any that are in the expanded problem list below  *Each unique test, order, or document contributes to the combination of 2 or combination of 3 in Category 1 below. I also independently reviewed radiology images for studies I described above in the HPI or studies I have ordered. For this visit I also reviewed old records and prior notes. No results for input(s): WBC, HGB, PLT, NA, K, CL, CO2, BUN, CREA, GLU, INR, APTT, ALT, AML, AML, LCAD, PCO2, PO2, HCO3 in the last 72 hours. Invalid input(s): PTP, TBIL, TBILI, CBIL, SGOT, GPT, AP, LPSE, NH4, TROPT, TROIQ,  PH  No results for input(s): INR, APTT, ALT, AML in the last 72 hours. Invalid input(s): PTP, TBIL, TBILI, CBIL, SGOT, GPT, AP, LPSE    Most recent blood counts (CBC) review  No results found for: WBC, HGB, HCT, MCV, PLT  Most recent chemistry (BMP) test review   No results found for: NA, K, CL, CO2, BUN, CREATININE, GLUCOSE, CALCIUM   Most recent Liver enzyme test review  No results found for: ALT, AST, GGT, ALKPHOS, BILITOT  Most recent coagulation (coags) review  @lastinr@  No results found for: INR, APTT, AML, AML, LCAD    Diabetic assessment  No results found for: LABA1C  No results found for: EAG    Nutritional assessment screen to assess wound healing ability:  No results found for: LABPROT, LABALBU  No results found for: TP, ALBR, ALB    Xray Result (most recent):  No results found for this or any previous visit from the past 3650 days.   XR Results (most recent):   Results from Hospital Encounter encounter on 17      XR ABD ACUTE W 1 V CHEST      Narrative   AP chest radiograph, KUB supine and upright views      History: abdominal pain, 62 years Female Severe acute abdominal pain that   radiates back      Comparison:  Chest radiograph 2014      Findings:  Normal cardiomediastinal silhouette. Mild diffuse interstitial   prominence unchanged, nonspecific. Minimal subsegmental atelectasis bilateral   lung bases. No evidence of pneumothorax, pleural effusion, or air space   consolidation. Visualized soft tissue and osseous structures otherwise   unremarkable. No free air is evident. Stool is seen throughout the colon. The bowel gas   pattern is nonspecific and there is no evidence of ileus or obstruction. No   suspicious renal or ureteral calculi are evident. Evidence of cholecystectomy. Visualized osseous structures appear unremarkable. Impression   Impression: No acute pathology identified. CT Result (most recent):  No results found for this or any previous visit from the past 3650 days. CT Results (most recent):   Results from Hospital Encounter encounter on 09      [id number]   [place] [address]      NAME: Sheridan Carcamo   PT : 1955               SEX: F         MR#: [id number]   LOCATION/NS: ER-                 AGE: 51X      ACCT: [de-identified]   ORDERING: Luis Angel Trinh                     PT TYPE: E   RADIOLOGISTMauri Pack ([id number])   Final Report         ICD Codes / Adm. Diagnosis:    /   Examination:  Maykel Wilks  - [id number] - Aug 21 2009  7:27PM      Reason:  left flank pain      ICD Codes / Adm. Diagnosis:    /   Examination:  Maykel Wilks  - [id number] - Aug 21 2009  7:27PM      CT OF THE ABDOMEN AND PELVIS WITHOUT CONTRAST, 09      Reason:  Left flank pain since ureteral stent placed 09.       REPORT: TECHNIQUE:  Noncontrast axial images were obtained through the abdomen and   pelvis. COMPARISON:  July 17, 2009. FINDINGS: Included portions of the lung bases are clear. ABDOMEN:  Since the previous study, a left-sided ureteral stent has been   placed. There has been an interval decompression of the left renal   collecting system. A residual 5-6 mm nonobstructing calculus is present in   the left renal pelvis. Multiple additional left-sided renal and ureteral   calculi have been removed. No right-sided renal or ureteral calculi are   present. Cholecystectomy clips are present. The top of the liver and   spleen are excluded from this study and cannot be evaluated. Evaluation of   the abdominal viscera is suboptimal without IV contrast.  The unenhanced   appearance of the included portions of the liver, included portions of the   spleen, pancreas and adrenal glands is normal.  The appendix is normal in   appearance. PELVIS:  A few sigmoid diverticula are present without changes of   diverticulitis. The bladder is normal in appearance. Changes of a   hysterectomy are present. IMPRESSION:      1. INTERVAL PLACEMENT OF A LEFT-SIDED URETERAL STENT WITH DECOMPRESSION OF   LEFT-SIDED HYDRONEPHROSIS AND REMOVAL OF MULTIPLE LEFT-SIDED RENAL CALCULI. A SINGLE NONOBSTRUCTING 5-6 MM STONE PERSISTS WITHIN THE LEFT RENAL PELVIS. 2. CHOLECYSTECTOMY AND HYSTERECTOMY. Interpreting/Reading Doctor: Tameka Weaver ([id number])   Transcribed: LW on 08/22/2009   Approved: Tameka Weaver ([id number])  08/23/2009           Admission date (for inpatients): 7/7/2022   Day of Surgery  * No procedures listed *    Assessment:      Problem List Items Addressed This Visit        Other    Pilonidal disease - Primary    Chronic dermatitis    Relevant Medications    olopatadine (PATANOL) 0.1 % ophthalmic solution          [unfilled]    Active Problems:    * No active hospital problems.  *  Resolved Problems:    * No resolved hospital problems. 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 Pilonidal disease   Chronic dermatitis      Presents to the wound center on 4/28/2022, after being seen in the ER 1 day prior. She had 2 complaints. She has a chronic periumbilical dermatitis that had typically responded to Bactroban that began developing cracks. She was told to continue this  treatment. She also was noted to have evidence of pilonidal type irritation and was also advised to use Bactroban. She uses Dial soap. She has history of hidradenitis treated in both axillae. She is currently permanently disabled from that condition. I think that her periumbilical dermatitis may actually be fungal in origin from overtreatment. I am recommending that she begin to shower with Nizoral shampoo as an antifungal agent. We will monitor this process and for not to use any more Bactroban. With regards to her pilonidal disease. She had extensive pilonidal cystectomy by Dr. Krystal Norris many years ago. She has a very deep gluteal cleft. Disease has reopened and is an ulcerated wound with 2 areas. She is not hirsute. She may be a candidate  for cleft lift procedure when we get better control of her dermatitis. For now we will dress the pilonidal area with Aquacel Ag. We will institute these first 2 treatments and see her back in 1 week. She returns on 5/5/2022.   She has seen a dermatologist in the interim who gave her a prescription for Diflucan. I have asked her still to hold off on the use of Bactroban and any harsh body washes. I would like her to continue to use the Nizoral shampoo as a body wash. The skin appears to be clearing up  especially the gluteal region. The chronic dermatitis at the periumbilical region has been classified as lichen planus on older notes. I think this will be very hard to control. I think as she continues to improve we can consider cleft lift flap reconstruction  of the gluteal cleft. We will need to make sure that she is medically ready for this procedure. Her medical condition is still unknown. We will institute Desitin to the abdomen and continue with silver alginate to the pilonidal area. She returns on 6/2/2022. She has improved with antifungal treatment. She is using Nizoral shampoo as a body wash 2 times per day and we will decrease to 1 time a bit day for another 2 weeks and then use Dove and Nizoral 1 time per week as maintenance. She will use a light coating of Desitin to the perianal intergluteal and periumbilical areas. She returns on 7/7/2022. She has done remarkably well with complete closure of all 3 wounds. It is unclear that she is following our instructions. There is no change from her prior recommendations but I think she is using some type of Vaseline product. Our recommendations remain the same. She should use an unscented Dove body wash for normal body washing and then 1 time per week use Nizoral  shampoo as a body wash. She should apply a light coating of Desitin to both these areas. We will see her back as needed. Wound Care  No orders of the defined types were placed in this encounter. Return Appointment:   Discharge with Gerhard Wilson MD        Instructions: Wounds are healed! No dressing needed.       Level of MDM (2/3 elements below)  Number and Complexity of Problems Addressed Amount and/or Complexity of Data to be Reviewed and Analyzed  *Each unique test, order, or document contributes to the combination of 2 or combination of 3 in Category 1 below.  Risk of Complications and/or Morbidity or Mortality of pt Management     49246  72565 SF Minimal  1self-limited or minor problem Minimal or none Minimal risk of morbidity from additional diagnostic testing or Rx   69456  35282 Low Low  2or more self-limited or minor problems;    or  1stable chronic illness;    or  [id number], uncomplicated illness or injury   Limited  (Must meet the requirements of at least 1 of the 2 categories)  Category 1: Tests and documents   Any combination of 2 from the following:  Review of prior external note(s) from each unique source*;  review of the result(s) of each unique test*;   ordering of each unique test*    or   Category 2: Assessment requiring an independent historian(s)  (For the categories of independent interpretation of tests and discussion of management or test interpretation, see moderate or high) Low risk of morbidity from additional diagnostic testing or treatment     54544  10354 Mod Moderate  1or more chronic illnesses with exacerbation, progression, or side effects of treatment;    or  2or more stable chronic illnesses;    or  1undiagnosed new problem with uncertain prognosis;    or  1acute illness with systemic symptoms;    or  [id number] complicated injury   Moderate  (Must meet the requirements of at least 1 out of 3 categories)  Category 1: Tests, documents, or independent historian(s)  Any combination of 3 from the following:   Review of prior external note(s) from each unique source*;  Review of the result(s) of each unique test*;  Ordering of each unique test*;  Assessment requiring an independent historian(s)    or  Category 2: Independent interpretation of tests   Independent interpretation of a test performed by another physician/other qualified health care professional (not separately reported); of morbidity from additional diagnostic testing or treatment  Examples only:  Drug therapy requiring intensive monitoring for toxicity  Decision regarding elective major surgery with identified patient or procedure risk factors  Decision regarding emergency major surgery  Decision regarding hospitalization  Decision not to resuscitate or to de-escalate care because of poor prognosis                 I have personally performed a face-to-face diagnostic evaluation and management  service on this patient. I have independently seen the patient. I have independently obtained the above history from the patient/family. I have independently examined the patient with above findings. I have independently reviewed data/labs for this patient and developed the above plan of care (MDM).       Electronically signed by Gillian Hoyt MD on 7/7/2022 at 3:03 PM

## 2022-07-20 ENCOUNTER — HOSPITAL ENCOUNTER (EMERGENCY)
Dept: GENERAL RADIOLOGY | Age: 67
Discharge: HOME | End: 2022-07-23
Payer: OTHER MISCELLANEOUS

## 2022-07-20 ENCOUNTER — HOSPITAL ENCOUNTER (EMERGENCY)
Age: 67
Discharge: HOME | End: 2022-07-20
Attending: EMERGENCY MEDICINE | Admitting: EMERGENCY MEDICINE
Payer: OTHER MISCELLANEOUS

## 2022-07-20 VITALS
SYSTOLIC BLOOD PRESSURE: 126 MMHG | TEMPERATURE: 97.8 F | HEIGHT: 67 IN | BODY MASS INDEX: 24.01 KG/M2 | RESPIRATION RATE: 16 BRPM | OXYGEN SATURATION: 99 % | WEIGHT: 153 LBS | HEART RATE: 51 BPM | DIASTOLIC BLOOD PRESSURE: 59 MMHG

## 2022-07-20 DIAGNOSIS — M54.9 ACUTE BILATERAL BACK PAIN, UNSPECIFIED BACK LOCATION: ICD-10-CM
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
## 2022-07-20 PROCEDURE — 72040 X-RAY EXAM NECK SPINE 2-3 VW: CPT

## 2022-07-20 PROCEDURE — 99284 EMERGENCY DEPT VISIT MOD MDM: CPT

## 2022-07-20 PROCEDURE — 96372 THER/PROPH/DIAG INJ SC/IM: CPT

## 2022-07-20 PROCEDURE — 72070 X-RAY EXAM THORAC SPINE 2VWS: CPT

## 2022-07-20 PROCEDURE — 6360000002 HC RX W HCPCS: Performed by: NURSE PRACTITIONER

## 2022-07-20 PROCEDURE — 72100 X-RAY EXAM L-S SPINE 2/3 VWS: CPT

## 2022-07-20 PROCEDURE — 6370000000 HC RX 637 (ALT 250 FOR IP): Performed by: NURSE PRACTITIONER

## 2022-07-20 RX ORDER — CYCLOBENZAPRINE HCL 10 MG
10 TABLET ORAL
Status: COMPLETED | OUTPATIENT
Start: 2022-07-20 | End: 2022-07-20

## 2022-07-20 RX ORDER — CYCLOBENZAPRINE HCL 10 MG
10 TABLET ORAL 3 TIMES DAILY PRN
Qty: 21 TABLET | Refills: 0 | Status: SHIPPED | OUTPATIENT
Start: 2022-07-20 | End: 2022-07-30

## 2022-07-20 RX ORDER — KETOROLAC TROMETHAMINE 30 MG/ML
15 INJECTION, SOLUTION INTRAMUSCULAR; INTRAVENOUS ONCE
Status: COMPLETED | OUTPATIENT
Start: 2022-07-20 | End: 2022-07-20

## 2022-07-20 RX ORDER — DICLOFENAC SODIUM 75 MG/1
75 TABLET, DELAYED RELEASE ORAL 2 TIMES DAILY
Qty: 30 TABLET | Refills: 0 | Status: SHIPPED | OUTPATIENT
Start: 2022-07-20

## 2022-07-20 RX ADMIN — KETOROLAC TROMETHAMINE 15 MG: 30 INJECTION, SOLUTION INTRAMUSCULAR; INTRAVENOUS at 08:24

## 2022-07-20 RX ADMIN — CYCLOBENZAPRINE 10 MG: 10 TABLET, FILM COATED ORAL at 08:23

## 2022-07-20 ASSESSMENT — ENCOUNTER SYMPTOMS
ABDOMINAL PAIN: 0
CONTUSION: 0
NAUSEA: 0
VOMITING: 0
SHORTNESS OF BREATH: 0
BACK PAIN: 1

## 2022-07-20 ASSESSMENT — PAIN DESCRIPTION - LOCATION
LOCATION: NECK

## 2022-07-20 ASSESSMENT — PAIN DESCRIPTION - FREQUENCY: FREQUENCY: CONTINUOUS

## 2022-07-20 ASSESSMENT — PAIN DESCRIPTION - DESCRIPTORS
DESCRIPTORS: ACHING
DESCRIPTORS: ACHING;SORE;TENDER
DESCRIPTORS: ACHING;SORE;TENDER

## 2022-07-20 ASSESSMENT — PAIN SCALES - GENERAL
PAINLEVEL_OUTOF10: 9
PAINLEVEL_OUTOF10: 8
PAINLEVEL_OUTOF10: 8
PAINLEVEL_OUTOF10: 9

## 2022-07-20 ASSESSMENT — PAIN - FUNCTIONAL ASSESSMENT: PAIN_FUNCTIONAL_ASSESSMENT: 0-10

## 2022-07-20 ASSESSMENT — PAIN DESCRIPTION - PAIN TYPE: TYPE: ACUTE PAIN

## 2022-07-20 NOTE — ED NOTES
I have reviewed discharge instructions with the patient. The patient verbalized understanding. Patient left ED via Discharge Method: ambulatory to Home with family. Opportunity for questions and clarification provided. Patient given 2 scripts. To continue your aftercare when you leave the hospital, you may receive an automated call from our care team to check in on how you are doing. This is a free service and part of our promise to provide the best care and service to meet your aftercare needs.  If you have questions, or wish to unsubscribe from this service please call [phone]. Thank you for Choosing our [facility] Emergency Department.         Alexander Newman RN  07/20/22 6631
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
## 2022-07-20 NOTE — DISCHARGE INSTRUCTIONS
Take medication as prescribed. Perform frequent, gentle stretching of your neck and back. Apply heat or take warm showers for 10 to 15 minutes at a time to help alleviate pain. Massage with over-the-counter muscle rub can also help alleviate pain. Chiropractic can also be helpful. Follow-up with your primary care provider. Return to the emergency department for any new, worsening, or concerning symptoms.
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
## 2024-05-28 ENCOUNTER — APPOINTMENT (OUTPATIENT)
Dept: GENERAL RADIOLOGY | Age: 69
End: 2024-05-28
Payer: MEDICARE

## 2024-05-28 ENCOUNTER — HOSPITAL ENCOUNTER (EMERGENCY)
Age: 69
Discharge: HOME | End: 2024-05-28
Attending: EMERGENCY MEDICINE
Payer: MEDICARE

## 2024-05-28 VITALS
OXYGEN SATURATION: 100 % | DIASTOLIC BLOOD PRESSURE: 76 MMHG | BODY MASS INDEX: 22.44 KG/M2 | SYSTOLIC BLOOD PRESSURE: 174 MMHG | TEMPERATURE: 98.4 F | HEART RATE: 52 BPM | RESPIRATION RATE: 16 BRPM | WEIGHT: 143 LBS | HEIGHT: 67 IN

## 2024-05-28 DIAGNOSIS — R07.9 CHEST PAIN, UNSPECIFIED TYPE: Primary | ICD-10-CM

## 2024-05-28 DIAGNOSIS — L73.2 HIDRADENITIS SUPPURATIVA: ICD-10-CM

## 2024-05-28 LAB
ALBUMIN SERPL-MCNC: 3.5 G/DL (ref 3.2–4.6)
ALBUMIN/GLOB SERPL: 0.8 (ref 1–1.9)
ALP SERPL-CCNC: 149 U/L (ref 35–104)
ALT SERPL-CCNC: 8 U/L (ref 12–65)
ANION GAP SERPL CALC-SCNC: 13 MMOL/L (ref 9–18)
AST SERPL-CCNC: 23 U/L (ref 15–37)
BASOPHILS # BLD: 0 K/UL (ref 0–0.2)
BASOPHILS NFR BLD: 1 % (ref 0–2)
BILIRUB SERPL-MCNC: 0.3 MG/DL (ref 0–1.2)
BUN SERPL-MCNC: 19 MG/DL (ref 8–23)
CALCIUM SERPL-MCNC: 10.1 MG/DL (ref 8.8–10.2)
CHLORIDE SERPL-SCNC: 106 MMOL/L (ref 98–107)
CO2 SERPL-SCNC: 21 MMOL/L (ref 20–28)
CREAT SERPL-MCNC: 1.4 MG/DL (ref 0.6–1.1)
DIFFERENTIAL METHOD BLD: ABNORMAL
EKG ATRIAL RATE: 57 BPM
EKG DIAGNOSIS: NORMAL
EKG P AXIS: 53 DEGREES
EKG P-R INTERVAL: 150 MS
EKG Q-T INTERVAL: 406 MS
EKG QRS DURATION: 104 MS
EKG QTC CALCULATION (BAZETT): 396 MS
EKG R AXIS: 34 DEGREES
EKG T AXIS: 37 DEGREES
EKG VENTRICULAR RATE: 57 BPM
EOSINOPHIL # BLD: 0.1 K/UL (ref 0–0.8)
EOSINOPHIL NFR BLD: 3 % (ref 0.5–7.8)
ERYTHROCYTE [DISTWIDTH] IN BLOOD BY AUTOMATED COUNT: 18.3 % (ref 11.9–14.6)
GLOBULIN SER CALC-MCNC: 4.6 G/DL (ref 2.3–3.5)
GLUCOSE SERPL-MCNC: 109 MG/DL (ref 70–99)
HCT VFR BLD AUTO: 38.9 % (ref 35.8–46.3)
HGB BLD-MCNC: 11.5 G/DL (ref 11.7–15.4)
IMM GRANULOCYTES # BLD AUTO: 0 K/UL (ref 0–0.5)
IMM GRANULOCYTES NFR BLD AUTO: 0 % (ref 0–5)
LYMPHOCYTES # BLD: 2.1 K/UL (ref 0.5–4.6)
LYMPHOCYTES NFR BLD: 41 % (ref 13–44)
MCH RBC QN AUTO: 21.7 PG (ref 26.1–32.9)
MCHC RBC AUTO-ENTMCNC: 29.6 G/DL (ref 31.4–35)
MCV RBC AUTO: 73.4 FL (ref 82–102)
MONOCYTES # BLD: 0.5 K/UL (ref 0.1–1.3)
MONOCYTES NFR BLD: 9 % (ref 4–12)
NEUTS SEG # BLD: 2.4 K/UL (ref 1.7–8.2)
NEUTS SEG NFR BLD: 47 % (ref 43–78)
NRBC # BLD: 0 K/UL (ref 0–0.2)
PLATELET # BLD AUTO: 288 K/UL (ref 150–450)
PMV BLD AUTO: 9.6 FL (ref 9.4–12.3)
POTASSIUM SERPL-SCNC: 4.8 MMOL/L (ref 3.5–5.1)
PROT SERPL-MCNC: 8.1 G/DL (ref 6.3–8.2)
RBC # BLD AUTO: 5.3 M/UL (ref 4.05–5.2)
SODIUM SERPL-SCNC: 140 MMOL/L (ref 136–145)
TROPONIN T SERPL HS-MCNC: <6 NG/L (ref 0–14)
WBC # BLD AUTO: 5.1 K/UL (ref 4.3–11.1)

## 2024-05-28 PROCEDURE — 93010 ELECTROCARDIOGRAM REPORT: CPT | Performed by: INTERNAL MEDICINE

## 2024-05-28 PROCEDURE — 84484 ASSAY OF TROPONIN QUANT: CPT

## 2024-05-28 PROCEDURE — 80053 COMPREHEN METABOLIC PANEL: CPT

## 2024-05-28 PROCEDURE — 94762 N-INVAS EAR/PLS OXIMTRY CONT: CPT

## 2024-05-28 PROCEDURE — 85025 COMPLETE CBC W/AUTO DIFF WBC: CPT

## 2024-05-28 PROCEDURE — 96374 THER/PROPH/DIAG INJ IV PUSH: CPT

## 2024-05-28 PROCEDURE — 71046 X-RAY EXAM CHEST 2 VIEWS: CPT

## 2024-05-28 PROCEDURE — 93005 ELECTROCARDIOGRAM TRACING: CPT | Performed by: EMERGENCY MEDICINE

## 2024-05-28 PROCEDURE — 6360000002 HC RX W HCPCS: Performed by: EMERGENCY MEDICINE

## 2024-05-28 PROCEDURE — 99285 EMERGENCY DEPT VISIT HI MDM: CPT

## 2024-05-28 RX ORDER — MORPHINE SULFATE 4 MG/ML
4 INJECTION, SOLUTION INTRAMUSCULAR; INTRAVENOUS
Status: COMPLETED | OUTPATIENT
Start: 2024-05-28 | End: 2024-05-28

## 2024-05-28 RX ORDER — LOSARTAN POTASSIUM 50 MG/1
50 TABLET ORAL DAILY
COMMUNITY
Start: 2024-03-11 | End: 2024-06-09

## 2024-05-28 RX ADMIN — MORPHINE SULFATE 4 MG: 4 INJECTION INTRAVENOUS at 11:09

## 2024-05-28 ASSESSMENT — ENCOUNTER SYMPTOMS
BACK PAIN: 1
DIARRHEA: 0
ABDOMINAL PAIN: 1
SHORTNESS OF BREATH: 0
VOMITING: 0
COUGH: 0
NAUSEA: 0

## 2024-05-28 ASSESSMENT — PAIN - FUNCTIONAL ASSESSMENT: PAIN_FUNCTIONAL_ASSESSMENT: 0-10

## 2024-05-28 ASSESSMENT — PAIN SCALES - GENERAL
PAINLEVEL_OUTOF10: 10
PAINLEVEL_OUTOF10: 10
PAINLEVEL_OUTOF10: 4

## 2024-05-28 ASSESSMENT — PAIN DESCRIPTION - LOCATION
LOCATION: BACK
LOCATION: CHEST

## 2024-05-28 ASSESSMENT — PAIN DESCRIPTION - PAIN TYPE: TYPE: ACUTE PAIN

## 2024-05-28 ASSESSMENT — LIFESTYLE VARIABLES
HOW MANY STANDARD DRINKS CONTAINING ALCOHOL DO YOU HAVE ON A TYPICAL DAY: PATIENT DOES NOT DRINK
HOW OFTEN DO YOU HAVE A DRINK CONTAINING ALCOHOL: NEVER

## 2024-05-28 ASSESSMENT — PAIN DESCRIPTION - DESCRIPTORS: DESCRIPTORS: ACHING

## 2024-05-28 NOTE — ED PROVIDER NOTES
[employer] Emergency Department Provider Note                   PCP:                Elmer Alvarado MD               Age: 69 y.o.      Sex: female     MEDICAL DECISION MAKING  Complexity of Problems Addressed:   1 or more chronic illness with an exacerbation or progression    Data Reviewed and Analyzed:  Category 1:    I have reviewed outside records from an external source for any pertinent PMH, ED visits, primary care visits, specialist visits, labs, EKG, and/or radiologic studies.    Category 2:     ED EKG Interpretation  EKG was interpreted in the absence of a cardiologist.    Rate: Rate: Normal  EKG Interpretation: EKG Interpretation: sinus rhythm, no evidence of arrhythmia  ST Segments: Nonspecific ST segments - NO STEMI      I independently ordered and reviewed the labs.    I have reviewed the Radiologist interpretation of the radiologic studies. My medical decision making regarding the radiologic studies is based on the interpretation report of the board certified Radiologist.                Category 3:     Discussion of management or test interpretation:    MDM  Number of Diagnoses or Management Options  Chest pain, unspecified type  Hidradenitis suppurativa  Diagnosis management comments: Patient presented for evaluation of discomfort from her hidradenitis suppurativa in the gluteal area.  Patient does have chronic appearing ulcerated wound but there is no signs of any active infection.  Patient is afebrile with normal white count.  Patient also complains of several months of chest pain.  Patient EKG showed no acute ischemic ST changes.  Her troponin was normal and ACS was ruled out.  Her chest x-ray showed no acute findings.  Patient does have chronic pain syndrome and is on Percocet for various other chronic pain complaints. Based on patient's symptoms, exam, and a thorough evaluation, I do not suspect NSTEMI, unstable angina, pulmonary embolism, pericardial disease, myocarditis, unstable arrhythmia,

## 2024-05-28 NOTE — ED TRIAGE NOTES
Patient to triage with c/o chest pain x 2 months that radiates to her back. Pt also states her crack in between her buttocks has split and been bleeding off and on for the past day or two.

## 2024-06-14 ENCOUNTER — HOSPITAL ENCOUNTER (OUTPATIENT)
Dept: WOUND CARE | Age: 69
Discharge: HOME | End: 2024-06-14
Payer: MEDICARE

## 2024-06-14 VITALS
RESPIRATION RATE: 18 BRPM | HEIGHT: 67 IN | HEART RATE: 67 BPM | DIASTOLIC BLOOD PRESSURE: 68 MMHG | TEMPERATURE: 97.9 F | SYSTOLIC BLOOD PRESSURE: 155 MMHG | BODY MASS INDEX: 21.82 KG/M2 | OXYGEN SATURATION: 100 % | WEIGHT: 139 LBS

## 2024-06-14 DIAGNOSIS — S31.819A BUTTOCK WOUND, RIGHT, INITIAL ENCOUNTER: Primary | ICD-10-CM

## 2024-06-14 PROCEDURE — 99203 OFFICE O/P NEW LOW 30 MIN: CPT | Performed by: FAMILY MEDICINE

## 2024-06-14 PROCEDURE — 99213 OFFICE O/P EST LOW 20 MIN: CPT

## 2024-06-14 RX ORDER — SODIUM CHLOR/HYPOCHLOROUS ACID 0.033 %
SOLUTION, IRRIGATION IRRIGATION ONCE
OUTPATIENT
Start: 2024-06-14 | End: 2024-06-14

## 2024-06-14 RX ORDER — TRIAMCINOLONE ACETONIDE 1 MG/G
OINTMENT TOPICAL ONCE
OUTPATIENT
Start: 2024-06-14 | End: 2024-06-14

## 2024-06-14 RX ORDER — GENTAMICIN SULFATE 1 MG/G
OINTMENT TOPICAL ONCE
OUTPATIENT
Start: 2024-06-14 | End: 2024-06-14

## 2024-06-14 RX ORDER — BACITRACIN ZINC AND POLYMYXIN B SULFATE 500; 1000 [USP'U]/G; [USP'U]/G
OINTMENT TOPICAL ONCE
OUTPATIENT
Start: 2024-06-14 | End: 2024-06-14

## 2024-06-14 RX ORDER — IBUPROFEN 200 MG
TABLET ORAL ONCE
OUTPATIENT
Start: 2024-06-14 | End: 2024-06-14

## 2024-06-14 RX ORDER — LIDOCAINE 40 MG/G
CREAM TOPICAL ONCE
OUTPATIENT
Start: 2024-06-14 | End: 2024-06-14

## 2024-06-14 RX ORDER — CLOBETASOL PROPIONATE 0.5 MG/G
OINTMENT TOPICAL ONCE
OUTPATIENT
Start: 2024-06-14 | End: 2024-06-14

## 2024-06-14 RX ORDER — FLUCONAZOLE 100 MG/1
100 TABLET ORAL DAILY
Qty: 7 TABLET | Refills: 0 | Status: SHIPPED | OUTPATIENT
Start: 2024-06-14 | End: 2024-06-21

## 2024-06-14 RX ORDER — LIDOCAINE HYDROCHLORIDE 20 MG/ML
JELLY TOPICAL ONCE
OUTPATIENT
Start: 2024-06-14 | End: 2024-06-14

## 2024-06-14 RX ORDER — BETAMETHASONE DIPROPIONATE 0.5 MG/G
CREAM TOPICAL ONCE
OUTPATIENT
Start: 2024-06-14 | End: 2024-06-14

## 2024-06-14 RX ORDER — LIDOCAINE 50 MG/G
OINTMENT TOPICAL ONCE
OUTPATIENT
Start: 2024-06-14 | End: 2024-06-14

## 2024-06-14 RX ORDER — LIDOCAINE HYDROCHLORIDE 40 MG/ML
SOLUTION TOPICAL ONCE
OUTPATIENT
Start: 2024-06-14 | End: 2024-06-14

## 2024-06-14 RX ORDER — GINSENG 100 MG
CAPSULE ORAL ONCE
OUTPATIENT
Start: 2024-06-14 | End: 2024-06-14

## 2024-06-14 ASSESSMENT — PAIN SCALES - GENERAL: PAINLEVEL_OUTOF10: 10

## 2024-06-14 NOTE — DISCHARGE INSTRUCTIONS
Instructions: buttocks:  Cleanse with normal saline.  Vashe Wound Solution soak placed on wound bed for a minimum of 60 seconds prior to dressing application.   Apply calmoseptine cream to buttocks.   Apply cream twice daily.     Obtain diflucan prescription from your pharmacy. Take for one week.

## 2024-06-14 NOTE — WOUND CARE
Discharge Instructions for  [facility]  [address] [place] [address]  [address]  [place] [id number]  Phone [phone]   Fax [phone]      NAME:  Venita Calvillo  YOB: 1955  MEDICAL RECORD NUMBER:  [id number]  DATE:  6/14/2024    Return Appointment:   2 weeks with Tu Auguste DO    Instructions: buttocks:  Cleanse with normal saline.  Vashe Wound Solution soak placed on wound bed for a minimum of 60 seconds prior to dressing application.   Apply calmoseptine cream to buttocks.   Apply cream twice daily.    Obtain diflucan prescription from your pharmacy. Take for one week.    Should you experience increased redness, swelling, pain, foul odor, size of wound(s), or have a temperature over 101 degrees please contact the Wound Healing Center at [phone] or if after hours contact your primary care physician or go to the hospital emergency department.    PLEASE NOTE: IF YOU ARE UNABLE TO OBTAIN WOUND SUPPLIES, CONTINUE TO USE THE SUPPLIES YOU HAVE AVAILABLE UNTIL YOU ARE ABLE TO REACH US. IT IS MOST IMPORTANT TO KEEP THE WOUND COVERED AT ALL TIMES.    Electronically signed Kishor Holder RN on 6/14/2024 at 8:45 AM

## 2024-06-14 NOTE — PROGRESS NOTES
[facility]   History and Physical Note   Referring Provider: Paolo Snow MD    Reason for Referral: Chronic gluteal fold ulcer area    Venita Calvillo  MEDICAL RECORD NUMBER:  [id number]  AGE: 69 y.o.   GENDER: female  : 1955  EPISODE DATE:  2024    Chief complaint and reason for visit:     Chief Complaint   Patient presents with    Wound Check     BUTTOCKS         HISTORY of PRESENT ILLNESS HPI     Venita Calvillo is a 69 y.o. female who presents today for an initial evaluation of a wound/ulcer. Patient is new to the wound center. Wound duration:  2 year(s).    History of Wound Context: Patient comes in today with a chronic area in the gluteal fold that she states is coming on for last several years.  She has been multiple ointments including solutions on this.  Areas uncomfortable.  Pertinent associated symptoms: drainage , swelling, and skin discoloration    PAST MEDICAL HISTORY        Diagnosis Date    Arthritis     osteo    Cataract     left eye    Chronic kidney disease     renal calc.    Chronic pain     joint    Gastrointestinal disorder     ulcer    GERD (gastroesophageal reflux disease)     takes occassional OTC meds    Hypertension     controlled by meds    Kidney stones     Other ill-defined conditions(799.89)     \"has no sweat glands\", sickle  cell    Sickle cell disease (HCC)     last crisis , well controlled per pt.       PAST SURGICAL HISTORY  Past Surgical History:   Procedure Laterality Date    CHOLECYSTECTOMY      GI  ,2006    hiatal hernia x 2    HYSTERECTOMY (CERVIX STATUS UNKNOWN)      ROYER    OTHER SURGICAL HISTORY      groin, buttocks, axilla- sweat glands    UROLOGICAL SURGERY   2009x23/2010    cyto,stentx3       FAMILY HISTORY  Family History   Problem Relation Age of Onset    Heart Disease Sister     Heart Disease Brother     Lung Disease Brother     Hypertension Sister     Heart Disease Brother     Lung Disease Brother

## 2024-06-14 NOTE — FLOWSHEET NOTE
06/14/24 0817   Wound 06/14/24 Buttocks Mid # 1   Date First Assessed/Time First Assessed: 06/14/24 0826   Present on Original Admission: Yes  Wound Approximate Age at First Assessment (Weeks): (c) 100 weeks  Location: Buttocks  Wound Location Orientation: Mid  Wound Description (Comments): # 1   Wound Image    Wound Etiology Other  (MASD)   Dressing Status Other (Comment)  (LUZ)   Wound Cleansed Vashe   Dressing/Treatment Other (comment)  (calmoseptine)   Wound Length (cm) 15 cm   Wound Width (cm) 1.1 cm   Wound Depth (cm) 0.1 cm   Wound Surface Area (cm^2) 16.5 cm^2   Wound Volume (cm^3) 1.65 cm^3   Wound Assessment Pink/red   Drainage Amount Moderate (25-50%)   Drainage Description Serosanguinous   Odor None   Alley-wound Assessment Intact   Wound Thickness Description not for Pressure Injury Full thickness   Pain Assessment   Pain Assessment 0-10   Pain Level 10

## 2024-06-28 ENCOUNTER — HOSPITAL ENCOUNTER (OUTPATIENT)
Dept: WOUND CARE | Age: 69
Discharge: HOME | End: 2024-06-28
Payer: MEDICARE

## 2024-06-28 VITALS
WEIGHT: 138 LBS | DIASTOLIC BLOOD PRESSURE: 87 MMHG | RESPIRATION RATE: 18 BRPM | HEART RATE: 58 BPM | TEMPERATURE: 98 F | BODY MASS INDEX: 21.61 KG/M2 | SYSTOLIC BLOOD PRESSURE: 158 MMHG

## 2024-06-28 DIAGNOSIS — S31.819A BUTTOCK WOUND, RIGHT, INITIAL ENCOUNTER: Primary | ICD-10-CM

## 2024-06-28 DIAGNOSIS — L30.9 CHRONIC DERMATITIS: ICD-10-CM

## 2024-06-28 PROCEDURE — 99212 OFFICE O/P EST SF 10 MIN: CPT

## 2024-06-28 RX ORDER — IBUPROFEN 200 MG
TABLET ORAL ONCE
OUTPATIENT
Start: 2024-06-28 | End: 2024-06-28

## 2024-06-28 RX ORDER — LIDOCAINE 40 MG/G
CREAM TOPICAL ONCE
OUTPATIENT
Start: 2024-06-28 | End: 2024-06-28

## 2024-06-28 RX ORDER — LIDOCAINE 50 MG/G
OINTMENT TOPICAL ONCE
OUTPATIENT
Start: 2024-06-28 | End: 2024-06-28

## 2024-06-28 RX ORDER — SODIUM CHLOR/HYPOCHLOROUS ACID 0.033 %
SOLUTION, IRRIGATION IRRIGATION ONCE
OUTPATIENT
Start: 2024-06-28 | End: 2024-06-28

## 2024-06-28 RX ORDER — LIDOCAINE HYDROCHLORIDE 20 MG/ML
JELLY TOPICAL ONCE
OUTPATIENT
Start: 2024-06-28 | End: 2024-06-28

## 2024-06-28 RX ORDER — LIDOCAINE HYDROCHLORIDE 40 MG/ML
SOLUTION TOPICAL ONCE
OUTPATIENT
Start: 2024-06-28 | End: 2024-06-28

## 2024-06-28 RX ORDER — BETAMETHASONE DIPROPIONATE 0.5 MG/G
CREAM TOPICAL ONCE
OUTPATIENT
Start: 2024-06-28 | End: 2024-06-28

## 2024-06-28 RX ORDER — CLOBETASOL PROPIONATE 0.5 MG/G
OINTMENT TOPICAL ONCE
OUTPATIENT
Start: 2024-06-28 | End: 2024-06-28

## 2024-06-28 RX ORDER — BACITRACIN ZINC AND POLYMYXIN B SULFATE 500; 1000 [USP'U]/G; [USP'U]/G
OINTMENT TOPICAL ONCE
OUTPATIENT
Start: 2024-06-28 | End: 2024-06-28

## 2024-06-28 RX ORDER — GENTAMICIN SULFATE 1 MG/G
OINTMENT TOPICAL ONCE
OUTPATIENT
Start: 2024-06-28 | End: 2024-06-28

## 2024-06-28 RX ORDER — GINSENG 100 MG
CAPSULE ORAL ONCE
OUTPATIENT
Start: 2024-06-28 | End: 2024-06-28

## 2024-06-28 RX ORDER — TRIAMCINOLONE ACETONIDE 1 MG/G
OINTMENT TOPICAL ONCE
OUTPATIENT
Start: 2024-06-28 | End: 2024-06-28

## 2024-06-28 NOTE — WOUND CARE
Discharge Instructions for  [facility]  [address] [place] [address]  [address] [place] [address]  Phone [phone]   Fax [phone]      NAME:  Venita Calvillo  YOB: 1955  MEDICAL RECORD NUMBER:  [id number]  DATE:  6/28/2024    Return Appointment:   2 weeks with Tu Auguste DO    Instructions: buttocks:  Cleanse with normal saline.  Vashe Wound Solution soak placed on wound bed for a minimum of 60 seconds prior to dressing application.   Apply calmoseptine cream to buttocks.   Apply cream twice daily.        Should you experience increased redness, swelling, pain, foul odor, size of wound(s), or have a temperature over 101 degrees please contact the Wound Healing Center at [phone] or if after hours contact your primary care physician or go to the hospital emergency department.    PLEASE NOTE: IF YOU ARE UNABLE TO OBTAIN WOUND SUPPLIES, CONTINUE TO USE THE SUPPLIES YOU HAVE AVAILABLE UNTIL YOU ARE ABLE TO REACH US. IT IS MOST IMPORTANT TO KEEP THE WOUND COVERED AT ALL TIMES.    Electronically signed Marisol Corona RN on 6/28/2024 at 8:53 AM

## 2024-06-28 NOTE — FLOWSHEET NOTE
06/28/24 0821   Wound 06/14/24 Buttocks Mid # 1   Date First Assessed/Time First Assessed: 06/14/24 0826   Present on Original Admission: Yes  Wound Approximate Age at First Assessment (Weeks): (c) 100 weeks  Location: Buttocks  Wound Location Orientation: Mid  Wound Description (Comments): # 1   Wound Image    Wound Etiology Other  (hidradenitis)   Wound Cleansed Vashe   Dressing/Treatment Calmoseptine/zinc oxide with menthol   Wound Length (cm) 3.5 cm   Wound Width (cm) 0.1 cm   Wound Depth (cm) 0.1 cm   Wound Surface Area (cm^2) 0.35 cm^2   Change in Wound Size % (l*w) 97.88   Wound Volume (cm^3) 0.035 cm^3   Wound Healing % 98   Wound Assessment Pink/red   Drainage Amount Scant (moist but unmeasurable)   Drainage Description Serosanguinous   Odor None   Alley-wound Assessment Intact   Wound Thickness Description not for Pressure Injury Full thickness

## 2024-06-28 NOTE — DISCHARGE INSTRUCTIONS
Discharge Instructions for  [facility]  [address] [place], [place] [address]  Phone [phone]   Fax [phone]        NAME:  Venita Calvillo  YOB: 1955  MEDICAL RECORD NUMBER:  [id number]  DATE:  6/28/2024     Return Appointment:   2 weeks with Tu Auguste DO     Instructions: buttocks:  Cleanse with normal saline.  Vashe Wound Solution soak placed on wound bed for a minimum of 60 seconds prior to dressing application.   Apply calmoseptine cream to buttocks.   Apply cream twice daily.

## 2024-07-12 ENCOUNTER — HOSPITAL ENCOUNTER (OUTPATIENT)
Dept: WOUND CARE | Age: 69
Discharge: HOME | End: 2024-07-12
Payer: MEDICARE

## 2024-07-12 VITALS
HEIGHT: 67 IN | BODY MASS INDEX: 21.94 KG/M2 | TEMPERATURE: 97.9 F | SYSTOLIC BLOOD PRESSURE: 150 MMHG | OXYGEN SATURATION: 98 % | HEART RATE: 57 BPM | RESPIRATION RATE: 12 BRPM | WEIGHT: 139.8 LBS | DIASTOLIC BLOOD PRESSURE: 79 MMHG

## 2024-07-12 DIAGNOSIS — S31.819A BUTTOCK WOUND, RIGHT, INITIAL ENCOUNTER: Primary | ICD-10-CM

## 2024-07-12 DIAGNOSIS — L30.9 CHRONIC DERMATITIS: ICD-10-CM

## 2024-07-12 PROCEDURE — 99213 OFFICE O/P EST LOW 20 MIN: CPT

## 2024-07-12 RX ORDER — BACITRACIN ZINC AND POLYMYXIN B SULFATE 500; 1000 [USP'U]/G; [USP'U]/G
OINTMENT TOPICAL ONCE
OUTPATIENT
Start: 2024-07-12 | End: 2024-07-12

## 2024-07-12 RX ORDER — LIDOCAINE HYDROCHLORIDE 20 MG/ML
JELLY TOPICAL ONCE
OUTPATIENT
Start: 2024-07-12 | End: 2024-07-12

## 2024-07-12 RX ORDER — SODIUM CHLOR/HYPOCHLOROUS ACID 0.033 %
SOLUTION, IRRIGATION IRRIGATION ONCE
Status: COMPLETED | OUTPATIENT
Start: 2024-07-12 | End: 2024-07-12

## 2024-07-12 RX ORDER — LIDOCAINE HYDROCHLORIDE 40 MG/ML
SOLUTION TOPICAL ONCE
OUTPATIENT
Start: 2024-07-12 | End: 2024-07-12

## 2024-07-12 RX ORDER — IBUPROFEN 200 MG
TABLET ORAL ONCE
OUTPATIENT
Start: 2024-07-12 | End: 2024-07-12

## 2024-07-12 RX ORDER — GINSENG 100 MG
CAPSULE ORAL ONCE
OUTPATIENT
Start: 2024-07-12 | End: 2024-07-12

## 2024-07-12 RX ORDER — LIDOCAINE 40 MG/G
CREAM TOPICAL ONCE
OUTPATIENT
Start: 2024-07-12 | End: 2024-07-12

## 2024-07-12 RX ORDER — CLOBETASOL PROPIONATE 0.5 MG/G
OINTMENT TOPICAL ONCE
OUTPATIENT
Start: 2024-07-12 | End: 2024-07-12

## 2024-07-12 RX ORDER — BETAMETHASONE DIPROPIONATE 0.5 MG/G
CREAM TOPICAL ONCE
OUTPATIENT
Start: 2024-07-12 | End: 2024-07-12

## 2024-07-12 RX ORDER — LIDOCAINE 50 MG/G
OINTMENT TOPICAL ONCE
OUTPATIENT
Start: 2024-07-12 | End: 2024-07-12

## 2024-07-12 RX ORDER — TRIAMCINOLONE ACETONIDE 1 MG/G
OINTMENT TOPICAL ONCE
OUTPATIENT
Start: 2024-07-12 | End: 2024-07-12

## 2024-07-12 RX ORDER — LIDOCAINE HYDROCHLORIDE 20 MG/ML
JELLY TOPICAL ONCE
Status: COMPLETED | OUTPATIENT
Start: 2024-07-12 | End: 2024-07-12

## 2024-07-12 RX ORDER — GENTAMICIN SULFATE 1 MG/G
OINTMENT TOPICAL ONCE
OUTPATIENT
Start: 2024-07-12 | End: 2024-07-12

## 2024-07-12 RX ORDER — SODIUM CHLOR/HYPOCHLOROUS ACID 0.033 %
SOLUTION, IRRIGATION IRRIGATION ONCE
OUTPATIENT
Start: 2024-07-12 | End: 2024-07-12

## 2024-07-12 RX ADMIN — LIDOCAINE HYDROCHLORIDE: 20 JELLY TOPICAL at 08:38

## 2024-07-12 RX ADMIN — Medication: at 08:39

## 2024-07-12 NOTE — DISCHARGE INSTRUCTIONS
Gluteal Cleft Wound:  Cleanse with normal saline.  Vashe moistened gauze to wound for 1 - 2 minutes.  Apply alginate with silver rope onto wound bed.    After purchase: Apply INTRA DRY with SILVER onto wound bed.  Dressing change daily.    Total Protein 100 gms daily - throughout the day.    Purchase online:    - VASHE WOUND SOLUTION  - Intra - Dry - wicking cloth with antimicrobial silver (Ag).

## 2024-07-12 NOTE — FLOWSHEET NOTE
07/12/24 0840   Wound 06/14/24 Buttocks Mid # 1   Date First Assessed/Time First Assessed: 06/14/24 0826   Present on Original Admission: Yes  Wound Approximate Age at First Assessment (Weeks): (c) 100 weeks  Location: Buttocks  Wound Location Orientation: Mid  Wound Description (Comments): # 1   Wound Image    Wound Etiology Other  (Hydradenitis, MASD)   Dressing Status New drainage noted   Wound Cleansed Cleansed with saline;Vashe   Dressing/Treatment Zinc paste   Wound Length (cm) 8 cm   Wound Width (cm) 0.4 cm   Wound Depth (cm) 0.1 cm   Wound Surface Area (cm^2) 3.2 cm^2   Change in Wound Size % (l*w) 80.61   Wound Volume (cm^3) 0.32 cm^3   Wound Healing % 81   Wound Assessment Pink/red;Granulation tissue   Drainage Amount Small (< 25%)   Drainage Description Serosanguinous   Odor None   Alley-wound Assessment Intact   Margins Attached edges   Wound Thickness Description not for Pressure Injury Full thickness   Pain Assessment   Pain Assessment None - Denies Pain   Pain Level 10

## 2024-07-23 PROBLEM — S31.819A BUTTOCK WOUND, RIGHT, INITIAL ENCOUNTER: Chronic | Status: ACTIVE | Noted: 2024-06-14

## 2024-08-02 ENCOUNTER — HOSPITAL ENCOUNTER (OUTPATIENT)
Dept: WOUND CARE | Age: 69
Discharge: HOME | End: 2024-08-02
Payer: MEDICARE

## 2024-08-02 VITALS
OXYGEN SATURATION: 96 % | HEART RATE: 73 BPM | HEIGHT: 67 IN | TEMPERATURE: 97.7 F | WEIGHT: 138.8 LBS | RESPIRATION RATE: 12 BRPM | DIASTOLIC BLOOD PRESSURE: 77 MMHG | BODY MASS INDEX: 21.79 KG/M2 | SYSTOLIC BLOOD PRESSURE: 139 MMHG

## 2024-08-02 DIAGNOSIS — S31.819A BUTTOCK WOUND, RIGHT, INITIAL ENCOUNTER: Primary | ICD-10-CM

## 2024-08-02 DIAGNOSIS — L30.9 CHRONIC DERMATITIS: ICD-10-CM

## 2024-08-02 PROCEDURE — 99212 OFFICE O/P EST SF 10 MIN: CPT

## 2024-08-02 RX ORDER — IBUPROFEN 200 MG
TABLET ORAL ONCE
Status: CANCELLED | OUTPATIENT
Start: 2024-08-02 | End: 2024-08-02

## 2024-08-02 RX ORDER — LIDOCAINE 40 MG/G
CREAM TOPICAL ONCE
Status: CANCELLED | OUTPATIENT
Start: 2024-08-02 | End: 2024-08-02

## 2024-08-02 RX ORDER — LIDOCAINE HYDROCHLORIDE 20 MG/ML
JELLY TOPICAL ONCE
Status: CANCELLED | OUTPATIENT
Start: 2024-08-02 | End: 2024-08-02

## 2024-08-02 RX ORDER — LIDOCAINE HYDROCHLORIDE 40 MG/ML
SOLUTION TOPICAL ONCE
Status: CANCELLED | OUTPATIENT
Start: 2024-08-02 | End: 2024-08-02

## 2024-08-02 RX ORDER — SODIUM CHLOR/HYPOCHLOROUS ACID 0.033 %
SOLUTION, IRRIGATION IRRIGATION ONCE
Status: CANCELLED | OUTPATIENT
Start: 2024-08-02 | End: 2024-08-02

## 2024-08-02 RX ORDER — LIDOCAINE 50 MG/G
OINTMENT TOPICAL ONCE
Status: CANCELLED | OUTPATIENT
Start: 2024-08-02 | End: 2024-08-02

## 2024-08-02 RX ORDER — CLOBETASOL PROPIONATE 0.5 MG/G
OINTMENT TOPICAL ONCE
Status: CANCELLED | OUTPATIENT
Start: 2024-08-02 | End: 2024-08-02

## 2024-08-02 RX ORDER — BACITRACIN ZINC AND POLYMYXIN B SULFATE 500; 1000 [USP'U]/G; [USP'U]/G
OINTMENT TOPICAL ONCE
Status: CANCELLED | OUTPATIENT
Start: 2024-08-02 | End: 2024-08-02

## 2024-08-02 RX ORDER — GENTAMICIN SULFATE 1 MG/G
OINTMENT TOPICAL ONCE
Status: CANCELLED | OUTPATIENT
Start: 2024-08-02 | End: 2024-08-02

## 2024-08-02 RX ORDER — GINSENG 100 MG
CAPSULE ORAL ONCE
Status: CANCELLED | OUTPATIENT
Start: 2024-08-02 | End: 2024-08-02

## 2024-08-02 RX ORDER — TRIAMCINOLONE ACETONIDE 1 MG/G
OINTMENT TOPICAL ONCE
Status: CANCELLED | OUTPATIENT
Start: 2024-08-02 | End: 2024-08-02

## 2024-08-02 RX ORDER — BETAMETHASONE DIPROPIONATE 0.5 MG/G
CREAM TOPICAL ONCE
Status: CANCELLED | OUTPATIENT
Start: 2024-08-02 | End: 2024-08-02

## 2024-08-02 NOTE — WOUND CARE
Discharge Instructions for  [facility]  [address] [place] [address]  [address] [place], [place] [id number]  Phone [phone]   Fax [phone]      NAME:  Venita Calvillo  YOB: 1955  MEDICAL RECORD NUMBER:  [id number]  DATE:  8/2/2024    Return Appointment:  discharge with Tu Auguste, DO  May call with any recurrence.      Instructions:   Gluteal Cleft Wound:  Cleanse with normal saline.  Vashe moistened gauze to wound for 1 - 2 minutes.  Apply alginate with silver rope onto wound bed.    After purchase: Apply INTRA DRY with SILVER onto wound bed.  Dressing change daily.     Total Protein 100 gms daily - throughout the day.     Purchase online:     - VASHE WOUND SOLUTION  - Intra - Dry - wicking cloth with antimicrobial silver (Ag).     Should you experience increased redness, swelling, pain, foul odor, size of wound(s), or have a temperature over 101 degrees please contact the Wound Healing Center at [phone] or if after hours contact your primary care physician or go to the hospital emergency department.    PLEASE NOTE: IF YOU ARE UNABLE TO OBTAIN WOUND SUPPLIES, CONTINUE TO USE THE SUPPLIES YOU HAVE AVAILABLE UNTIL YOU ARE ABLE TO REACH US. IT IS MOST IMPORTANT TO KEEP THE WOUND COVERED AT ALL TIMES.    Electronically signed Rach Berry RN on 8/2/2024 at 8:36 AM

## 2024-08-02 NOTE — FLOWSHEET NOTE
08/02/24 0830   Wound 06/14/24 Buttocks Mid # 1   Date First Assessed/Time First Assessed: 06/14/24 0826   Present on Original Admission: Yes  Wound Approximate Age at First Assessment (Weeks): (c) 100 weeks  Location: Buttocks  Wound Location Orientation: Mid  Wound Description (Comments): # 1   Wound Image    Wound Etiology Other  (Hydradenitis, MASD)   Dressing Status Dry;Clean;Intact   Wound Cleansed Cleansed with saline   Dressing/Treatment Hydrofera blue   Wound Length (cm) 0 cm   Wound Width (cm) 0 cm   Wound Depth (cm) 0 cm   Wound Surface Area (cm^2) 0 cm^2   Change in Wound Size % (l*w) 100   Wound Volume (cm^3) 0 cm^3   Wound Healing % 100   Wound Assessment Epithelialization   Drainage Amount None (dry)   Alley-wound Assessment Intact   Pain Assessment   Pain Assessment None - Denies Pain

## 2024-08-02 NOTE — DISCHARGE INSTRUCTIONS
Return Appointment:  discharge with Tu Auguste DO  May call with any recurrence.      Instructions:   Gluteal Cleft Wound:  Cleanse with normal saline.  Vashe moistened gauze to wound for 1 - 2 minutes.  Apply alginate with silver rope onto wound bed.    After purchase: Apply INTRA DRY with SILVER onto wound bed.  Dressing change daily.     Total Protein 100 gms daily - throughout the day.     Purchase online:     - VASHE WOUND SOLUTION  - Intra - Dry - wicking cloth with antimicrobial silver (Ag).

## 2024-08-09 ENCOUNTER — HOSPITAL ENCOUNTER (OUTPATIENT)
Dept: WOUND CARE | Age: 69
Discharge: HOME | End: 2024-08-09
Payer: MEDICARE

## 2024-08-09 VITALS
SYSTOLIC BLOOD PRESSURE: 168 MMHG | BODY MASS INDEX: 21.35 KG/M2 | RESPIRATION RATE: 18 BRPM | DIASTOLIC BLOOD PRESSURE: 92 MMHG | WEIGHT: 136 LBS | HEART RATE: 56 BPM | TEMPERATURE: 98 F | HEIGHT: 67 IN

## 2024-08-09 DIAGNOSIS — S31.819A BUTTOCK WOUND, RIGHT, INITIAL ENCOUNTER: Primary | Chronic | ICD-10-CM

## 2024-08-09 PROCEDURE — 99213 OFFICE O/P EST LOW 20 MIN: CPT | Performed by: FAMILY MEDICINE

## 2024-08-09 PROCEDURE — 99212 OFFICE O/P EST SF 10 MIN: CPT

## 2024-08-09 RX ORDER — LIDOCAINE HYDROCHLORIDE 20 MG/ML
JELLY TOPICAL ONCE
Status: COMPLETED | OUTPATIENT
Start: 2024-08-09 | End: 2024-08-09

## 2024-08-09 RX ADMIN — LIDOCAINE HYDROCHLORIDE: 20 JELLY TOPICAL at 11:38

## 2024-08-09 ASSESSMENT — PAIN SCALES - GENERAL: PAINLEVEL_OUTOF10: 10

## 2024-08-09 ASSESSMENT — PAIN DESCRIPTION - LOCATION: LOCATION: BUTTOCKS

## 2024-08-09 ASSESSMENT — PAIN DESCRIPTION - ORIENTATION: ORIENTATION: MID

## 2024-08-09 ASSESSMENT — PAIN DESCRIPTION - DESCRIPTORS: DESCRIPTORS: ACHING

## 2024-08-09 NOTE — PROGRESS NOTES
[facility]   [facility]   Medical Staff Progress Note     Venita Calvillo  MEDICAL RECORD NUMBER:  [id number]  AGE: 69 y.o.   GENDER: female  : 1955  EPISODE DATE:  2024    Chief complaint and reason for visit:     Chief Complaint   Patient presents with    Wound Check     Wound to gluteal cleft      Patient presenting for follow up evaluation of wound(s) per chief complaint.  Patient's gluteal cleft wound has completely opened again.  It was healed a week ago.  She has been dealing with this off and on for 40 some years she states    Subjective and ROS: Symptoms, wound related issues, or other pertinent wound history since last visit: no new wound care issues. Tolerating current treatment. No systemic complaints above baseline.    History of Wound Context: Per original history and physical on this patient. Changes in history since last evaluation: none    Medical Decision Making:     Problem List Items Addressed This Visit          Other    * (Principal) Buttock wound, right, initial encounter - Primary (Chronic)    Relevant Orders    Amb External Referral To Plastic Surgery       Wounds and Treatment Plan:    Today will utilize Xeroform over the area in the gluteal cleft.  Will see her in 1 week.  We will get plastic surgery opinion on whether this scar tissue could be excised to be able to help patient.     Return Appointment:  1 week with Tu Auguste DO     Instructions:   Gluteal Cleft Wound:  Cleanse with normal saline.  Vashe moistened gauze to wound for 1 - 2 minutes.  Apply Xeroform onto wound bed.  Cover with guaze   Dressing change daily.     Referral for Dr. Lainez placed this visit.      Total Protein 100 gms daily - throughout the day.     Purchase online:     - VASHE WOUND SOLUTION  - Intra - Dry - wicking cloth with antimicrobial silver (Ag).   Other associated diagnoses or problems addressed:  Chronic gluteal cleft

## 2024-08-09 NOTE — DISCHARGE INSTRUCTIONS
ROSARIO:  Venita Calvillo  YOB: 1955  MEDICAL RECORD NUMBER:  [id number]  DATE:  8/9/2024     Return Appointment:  1 week with Tu Auguste DO     Instructions:   Gluteal Cleft Wound:  Cleanse with normal saline.  Vashe moistened gauze to wound for 1 - 2 minutes.  Apply Xeroform onto wound bed.  Cover with guaze   Dressing change daily.     Referral for Dr. Lainez placed this visit.      Total Protein 100 gms daily - throughout the day.     Purchase online:     - VASHE WOUND SOLUTION  - Intra - Dry - wicking cloth with antimicrobial silver (Ag).      Should you experience increased redness, swelling, pain, foul odor, size of wound(s), or have a temperature over 101 degrees please contact the [facility] at [phone] or if after hours contact your primary care physician or go to the hospital emergency department.     PLEASE NOTE: IF YOU ARE UNABLE TO OBTAIN WOUND SUPPLIES, CONTINUE TO USE THE SUPPLIES YOU HAVE AVAILABLE UNTIL YOU ARE ABLE TO REACH US. IT IS MOST IMPORTANT TO KEEP THE WOUND COVERED AT ALL TIMES.

## 2024-08-09 NOTE — FLOWSHEET NOTE
08/09/24 0845   Wound 06/14/24 Buttocks Mid # 1   Date First Assessed/Time First Assessed: 06/14/24 0826   Present on Original Admission: Yes  Wound Approximate Age at First Assessment (Weeks): (c) 100 weeks  Location: Buttocks  Wound Location Orientation: Mid  Wound Description (Comments): # 1   Wound Image    Wound Etiology Other  (MASD)   Dressing Status Dry;Clean;Intact   Wound Cleansed Cleansed with saline   Dressing/Treatment Gauze dressing/dressing sponge   Wound Length (cm) 16 cm   Wound Width (cm) 0.9 cm   Wound Depth (cm) 0.1 cm   Wound Surface Area (cm^2) 14.4 cm^2   Change in Wound Size % (l*w) 12.73   Wound Volume (cm^3) 1.44 cm^3   Wound Healing % 13   Wound Assessment Granulation tissue   Drainage Amount Small (< 25%)   Drainage Description Serosanguinous   Odor None   Alley-wound Assessment Intact   Wound Thickness Description not for Pressure Injury Full thickness   Pain Assessment   Pain Assessment 0-10   Pain Level 10   Pain Location Buttocks   Pain Orientation Mid   Pain Descriptors Aching

## 2024-08-09 NOTE — WOUND CARE
Discharge Instructions for  [facility]  [address] [place] [address]  [address] [place] [address]  Phone [phone]   Fax [phone]      NAME:  Venita Calvillo  YOB: 1955  MEDICAL RECORD NUMBER:  [id number]  DATE:  8/9/2024    Return Appointment:  1 week with Tu Auguste DO    Instructions:   Gluteal Cleft Wound:  Cleanse with normal saline.  Vashe moistened gauze to wound for 1 - 2 minutes.  Apply Xeroform onto wound bed.  Cover with guaze   Dressing change daily.     Referral for Dr. Lainez placed this visit.     Total Protein 100 gms daily - throughout the day.     Purchase online:     - VASHE WOUND SOLUTION  - Intra - Dry - wicking cloth with antimicrobial silver (Ag).     Should you experience increased redness, swelling, pain, foul odor, size of wound(s), or have a temperature over 101 degrees please contact the [facility] at [phone] or if after hours contact your primary care physician or go to the hospital emergency department.    PLEASE NOTE: IF YOU ARE UNABLE TO OBTAIN WOUND SUPPLIES, CONTINUE TO USE THE SUPPLIES YOU HAVE AVAILABLE UNTIL YOU ARE ABLE TO REACH US. IT IS MOST IMPORTANT TO KEEP THE WOUND COVERED AT ALL TIMES.    Electronically signed MIKA HOLBROOK RN on 8/9/2024 at 9:17 AM

## 2024-08-12 ENCOUNTER — APPOINTMENT (OUTPATIENT)
Dept: GENERAL RADIOLOGY | Age: 69
End: 2024-08-12
Payer: MEDICARE

## 2024-08-12 ENCOUNTER — HOSPITAL ENCOUNTER (EMERGENCY)
Age: 69
Discharge: HOME | End: 2024-08-12
Attending: STUDENT IN AN ORGANIZED HEALTH CARE EDUCATION/TRAINING PROGRAM
Payer: MEDICARE

## 2024-08-12 VITALS
OXYGEN SATURATION: 98 % | HEIGHT: 67 IN | RESPIRATION RATE: 12 BRPM | TEMPERATURE: 97.8 F | DIASTOLIC BLOOD PRESSURE: 64 MMHG | HEART RATE: 51 BPM | WEIGHT: 136 LBS | BODY MASS INDEX: 21.35 KG/M2 | SYSTOLIC BLOOD PRESSURE: 146 MMHG

## 2024-08-12 DIAGNOSIS — R07.89 ATYPICAL CHEST PAIN: Primary | ICD-10-CM

## 2024-08-12 DIAGNOSIS — S31.000A WOUND OF SACRAL REGION, INITIAL ENCOUNTER: ICD-10-CM

## 2024-08-12 LAB
ALBUMIN SERPL-MCNC: 3.3 G/DL (ref 3.2–4.6)
ALBUMIN/GLOB SERPL: 0.8 (ref 1–1.9)
ALP SERPL-CCNC: 151 U/L (ref 35–104)
ALT SERPL-CCNC: 11 U/L (ref 12–65)
ANION GAP SERPL CALC-SCNC: 13 MMOL/L (ref 9–18)
AST SERPL-CCNC: 16 U/L (ref 15–37)
BASOPHILS # BLD: 0 K/UL (ref 0–0.2)
BASOPHILS NFR BLD: 1 % (ref 0–2)
BILIRUB SERPL-MCNC: 0.3 MG/DL (ref 0–1.2)
BUN SERPL-MCNC: 21 MG/DL (ref 8–23)
CALCIUM SERPL-MCNC: 9.4 MG/DL (ref 8.8–10.2)
CHLORIDE SERPL-SCNC: 107 MMOL/L (ref 98–107)
CO2 SERPL-SCNC: 20 MMOL/L (ref 20–28)
CREAT SERPL-MCNC: 1.35 MG/DL (ref 0.6–1.1)
DIFFERENTIAL METHOD BLD: ABNORMAL
EKG ATRIAL RATE: 74 BPM
EKG DIAGNOSIS: NORMAL
EKG P AXIS: 65 DEGREES
EKG P-R INTERVAL: 156 MS
EKG Q-T INTERVAL: 430 MS
EKG QRS DURATION: 94 MS
EKG QTC CALCULATION (BAZETT): 474 MS
EKG R AXIS: 46 DEGREES
EKG T AXIS: 14 DEGREES
EKG VENTRICULAR RATE: 73 BPM
EOSINOPHIL # BLD: 0.1 K/UL (ref 0–0.8)
EOSINOPHIL NFR BLD: 3 % (ref 0.5–7.8)
ERYTHROCYTE [DISTWIDTH] IN BLOOD BY AUTOMATED COUNT: 18.7 % (ref 11.9–14.6)
GLOBULIN SER CALC-MCNC: 4.1 G/DL (ref 2.3–3.5)
GLUCOSE SERPL-MCNC: 115 MG/DL (ref 70–99)
HCT VFR BLD AUTO: 36.2 % (ref 35.8–46.3)
HGB BLD-MCNC: 10.9 G/DL (ref 11.7–15.4)
IMM GRANULOCYTES # BLD AUTO: 0 K/UL (ref 0–0.5)
IMM GRANULOCYTES NFR BLD AUTO: 0 % (ref 0–5)
LYMPHOCYTES # BLD: 1.7 K/UL (ref 0.5–4.6)
LYMPHOCYTES NFR BLD: 42 % (ref 13–44)
MCH RBC QN AUTO: 22.8 PG (ref 26.1–32.9)
MCHC RBC AUTO-ENTMCNC: 30.1 G/DL (ref 31.4–35)
MCV RBC AUTO: 75.6 FL (ref 82–102)
MONOCYTES # BLD: 0.4 K/UL (ref 0.1–1.3)
MONOCYTES NFR BLD: 9 % (ref 4–12)
NEUTS SEG # BLD: 1.8 K/UL (ref 1.7–8.2)
NEUTS SEG NFR BLD: 45 % (ref 43–78)
NRBC # BLD: 0 K/UL (ref 0–0.2)
PLATELET # BLD AUTO: 247 K/UL (ref 150–450)
PMV BLD AUTO: 9 FL (ref 9.4–12.3)
POTASSIUM SERPL-SCNC: 3.8 MMOL/L (ref 3.5–5.1)
PROT SERPL-MCNC: 7.4 G/DL (ref 6.3–8.2)
RBC # BLD AUTO: 4.79 M/UL (ref 4.05–5.2)
SODIUM SERPL-SCNC: 140 MMOL/L (ref 136–145)
TROPONIN T SERPL HS-MCNC: <6 NG/L (ref 0–14)
WBC # BLD AUTO: 4 K/UL (ref 4.3–11.1)

## 2024-08-12 PROCEDURE — 93010 ELECTROCARDIOGRAM REPORT: CPT | Performed by: INTERNAL MEDICINE

## 2024-08-12 PROCEDURE — 96374 THER/PROPH/DIAG INJ IV PUSH: CPT

## 2024-08-12 PROCEDURE — 85025 COMPLETE CBC W/AUTO DIFF WBC: CPT

## 2024-08-12 PROCEDURE — 6360000002 HC RX W HCPCS: Performed by: STUDENT IN AN ORGANIZED HEALTH CARE EDUCATION/TRAINING PROGRAM

## 2024-08-12 PROCEDURE — 96375 TX/PRO/DX INJ NEW DRUG ADDON: CPT

## 2024-08-12 PROCEDURE — 99285 EMERGENCY DEPT VISIT HI MDM: CPT

## 2024-08-12 PROCEDURE — 93005 ELECTROCARDIOGRAM TRACING: CPT | Performed by: STUDENT IN AN ORGANIZED HEALTH CARE EDUCATION/TRAINING PROGRAM

## 2024-08-12 PROCEDURE — 71045 X-RAY EXAM CHEST 1 VIEW: CPT

## 2024-08-12 PROCEDURE — 80053 COMPREHEN METABOLIC PANEL: CPT

## 2024-08-12 PROCEDURE — 84484 ASSAY OF TROPONIN QUANT: CPT

## 2024-08-12 RX ORDER — ONDANSETRON 2 MG/ML
4 INJECTION INTRAMUSCULAR; INTRAVENOUS
Status: COMPLETED | OUTPATIENT
Start: 2024-08-12 | End: 2024-08-12

## 2024-08-12 RX ORDER — MORPHINE SULFATE 2 MG/ML
4 INJECTION, SOLUTION INTRAMUSCULAR; INTRAVENOUS ONCE
Status: COMPLETED | OUTPATIENT
Start: 2024-08-12 | End: 2024-08-12

## 2024-08-12 RX ADMIN — MORPHINE SULFATE 4 MG: 2 INJECTION, SOLUTION INTRAMUSCULAR; INTRAVENOUS at 08:34

## 2024-08-12 RX ADMIN — ONDANSETRON 4 MG: 2 INJECTION INTRAMUSCULAR; INTRAVENOUS at 08:32

## 2024-08-12 ASSESSMENT — PAIN SCALES - GENERAL
PAINLEVEL_OUTOF10: 7
PAINLEVEL_OUTOF10: 10

## 2024-08-12 NOTE — ED TRIAGE NOTES
Pt. A/ox4 and ambulatory to triage. Pt. C/o chest pain [id number]. Pt. C/o wound on sacrum that needs to be checked.

## 2024-08-12 NOTE — DISCHARGE INSTRUCTIONS
Follow-up with wound care later this week as previously scheduled.  Continue taking your home pain medication as needed.  Keep wound clean and continue wound care as directed by your wound care physician.  Return to the ER for worsening or worrisome symptoms.

## 2024-08-12 NOTE — ED PROVIDER NOTES
Emergency Department Provider Note       PCP: Elmer Alvarado MD   Age: 69 y.o.   Sex: female     DISPOSITION Decision To Discharge 08/12/2024 08:17:27 AM       ICD-10-CM    1. Atypical chest pain  R07.89       2. Wound of sacral region, initial encounter  S31.000A     chronic          Medical Decision Making     69-year-old female presents emerged part with complaint of sacral wound pain from a chronic wound that she sees wound care for.  Has been compliant with her narcotic pain medication.  Also complains of chest pain that is left-sided and has been present for the past few weeks.  States it is constant.  Denies any worsening or improving factors.  Denies fevers or chills.  Denies nausea or vomiting.  Patient requesting a dose of pain medication that previously had resolved her discomfort.  Will give a dose of morphine.  Basic blood work obtained, normal CBC, normal CMP, troponin is normal and chest x-ray is normal.  Patient be discharged home with outpatient wound care follow-up as previously scheduled.  Return precautions given.  Patient and family voiced understanding and agreement.     1 or more acute illnesses that pose a threat to life or bodily function.   Over the counter drug management performed.  Shared medical decision making was utilized in creating the patients health plan today.    I independently ordered and reviewed each unique test.  I reviewed external records: provider visit note from outside specialist.     I interpreted the X-rays no pneumothorax.  My Independent EKG Interpretation: sinus rhythm, no evidence of arrhythmia      ST Segments:Nonspecific ST segments - NO STEMI   Rate: 73            History     69-year-old female presents emerged part with complaint of sacral wound pain from a chronic wound that she sees wound care for.  Has been compliant with her narcotic pain medication.  Also complains of chest pain that is left-sided and has been present for the past few weeks.  States

## 2024-08-16 ENCOUNTER — HOSPITAL ENCOUNTER (OUTPATIENT)
Dept: WOUND CARE | Age: 69
Discharge: HOME | End: 2024-08-16
Payer: MEDICARE

## 2024-08-16 VITALS
WEIGHT: 139 LBS | RESPIRATION RATE: 18 BRPM | HEART RATE: 63 BPM | TEMPERATURE: 97.8 F | BODY MASS INDEX: 21.82 KG/M2 | HEIGHT: 67 IN | DIASTOLIC BLOOD PRESSURE: 85 MMHG | SYSTOLIC BLOOD PRESSURE: 165 MMHG

## 2024-08-16 PROCEDURE — 99213 OFFICE O/P EST LOW 20 MIN: CPT

## 2024-08-16 ASSESSMENT — PAIN DESCRIPTION - DESCRIPTORS: DESCRIPTORS: ACHING

## 2024-08-16 ASSESSMENT — PAIN DESCRIPTION - LOCATION: LOCATION: BUTTOCKS

## 2024-08-16 ASSESSMENT — PAIN SCALES - GENERAL: PAINLEVEL_OUTOF10: 5

## 2024-08-16 NOTE — FLOWSHEET NOTE
08/16/24 0906   Wound 06/14/24 Buttocks Mid # 1   Date First Assessed/Time First Assessed: 06/14/24 0826   Present on Original Admission: Yes  Wound Approximate Age at First Assessment (Weeks): (c) 100 weeks  Location: Buttocks  Wound Location Orientation: Mid  Wound Description (Comments): # 1   Wound Image    Wound Etiology Other  (MASD)   Dressing Status Intact   Wound Cleansed Cleansed with saline   Dressing/Treatment Xeroform   Wound Length (cm) 5 cm   Wound Width (cm) 0.5 cm   Wound Depth (cm) 0.1 cm   Wound Surface Area (cm^2) 2.5 cm^2   Change in Wound Size % (l*w) 84.85   Wound Volume (cm^3) 0.25 cm^3   Wound Healing % 85   Wound Assessment Pink/red   Drainage Amount Small (< 25%)   Drainage Description Serosanguinous   Odor None   Alley-wound Assessment Maceration   Wound Thickness Description not for Pressure Injury Full thickness

## 2024-08-16 NOTE — WOUND CARE
Discharge Instructions for  [facility]  [address] [place] [address]  [address] [place] [address]  Phone [phone]   Fax [phone]      NAME:  Venita Calvillo  YOB: 1955  MEDICAL RECORD NUMBER:  [id number]  DATE:  8/16/2024    Return Appointment:  1 week with Tu Auguste DO    Instructions:   Gluteal Cleft Wound:  Cleanse with normal saline.  Vashe moistened gauze to wound for 1 - 2 minutes.  Apply Xeroform onto wound bed.  Cover with guaze   Dressing change daily.     Referral for Dr. Lainez- his office should call you today, 8/16/24.    Total Protein 100 gms daily - throughout the day.     Purchase online:  - VASHE WOUND SOLUTION  - Intra - Dry - wicking cloth with antimicrobial silver (Ag).     Should you experience increased redness, swelling, pain, foul odor, size of wound(s), or have a temperature over 101 degrees please contact the [facility] at [phone] or if after hours contact your primary care physician or go to the hospital emergency department.    PLEASE NOTE: IF YOU ARE UNABLE TO OBTAIN WOUND SUPPLIES, CONTINUE TO USE THE SUPPLIES YOU HAVE AVAILABLE UNTIL YOU ARE ABLE TO REACH US. IT IS MOST IMPORTANT TO KEEP THE WOUND COVERED AT ALL TIMES.    Electronically signed Zainab Morales RN on 8/16/2024 at 9:26 AM

## 2024-08-23 ENCOUNTER — HOSPITAL ENCOUNTER (OUTPATIENT)
Dept: WOUND CARE | Age: 69
Discharge: HOME | End: 2024-08-23
Payer: MEDICARE

## 2024-08-23 VITALS
BODY MASS INDEX: 20.89 KG/M2 | SYSTOLIC BLOOD PRESSURE: 169 MMHG | TEMPERATURE: 98.1 F | DIASTOLIC BLOOD PRESSURE: 94 MMHG | WEIGHT: 133.4 LBS

## 2024-08-23 PROCEDURE — 99212 OFFICE O/P EST SF 10 MIN: CPT

## 2024-08-23 NOTE — WOUND CARE
Discharge Instructions for  [facility]  [address] [place] [address]  [address] [place] [address]  Phone [phone]   Fax [phone]      NAME:  Venita Calvillo  YOB: 1955  MEDICAL RECORD NUMBER:  [id number]  DATE:  8/23/2024    Return Appointment: 3 weeks with Tu Auguste DO    Instructions:   Gluteal Cleft Wound:  Cleanse with normal saline.  Vashe moistened gauze to wound for 1 - 2 minutes.  Apply Xeroform onto wound bed.  Cover with guaze   Dressing change daily.     Referral for Dr. Lainez- follow up at [phone]    Total Protein 100 gms daily - throughout the day.     Purchase online:  - VASHE WOUND SOLUTION  - Intra - Dry - wicking cloth with antimicrobial silver (Ag).     Should you experience increased redness, swelling, pain, foul odor, size of wound(s), or have a temperature over 101 degrees please contact the [facility] at [phone] or if after hours contact your primary care physician or go to the hospital emergency department.    PLEASE NOTE: IF YOU ARE UNABLE TO OBTAIN WOUND SUPPLIES, CONTINUE TO USE THE SUPPLIES YOU HAVE AVAILABLE UNTIL YOU ARE ABLE TO REACH US. IT IS MOST IMPORTANT TO KEEP THE WOUND COVERED AT ALL TIMES.    Electronically signed Marisol Corona RN on 8/23/2024 at 8:46 AM

## 2024-08-23 NOTE — DISCHARGE INSTRUCTIONS
NAME:  Venita Calvillo  YOB: 1955  MEDICAL RECORD NUMBER:  [id number]  DATE:  8/23/2024     Return Appointment: 3 weeks with Tu Auguste DO     Instructions:   Gluteal Cleft Wound:  Cleanse with normal saline.  Vashe moistened gauze to wound for 1 - 2 minutes.  Apply Xeroform onto wound bed.  Cover with guaze   Dressing change daily.     Referral for Dr. Lainez- follow up at [phone]     Total Protein 100 gms daily - throughout the day.     Purchase online:  - VASHE WOUND SOLUTION  - Intra - Dry - wicking cloth with antimicrobial silver (Ag).

## 2024-08-24 RX ORDER — GINSENG 100 MG
CAPSULE ORAL ONCE
OUTPATIENT
Start: 2024-08-24 | End: 2024-08-24

## 2024-08-24 RX ORDER — BACITRACIN ZINC AND POLYMYXIN B SULFATE 500; 1000 [USP'U]/G; [USP'U]/G
OINTMENT TOPICAL ONCE
OUTPATIENT
Start: 2024-08-24 | End: 2024-08-24

## 2024-08-24 RX ORDER — TRIAMCINOLONE ACETONIDE 1 MG/G
OINTMENT TOPICAL ONCE
OUTPATIENT
Start: 2024-08-24 | End: 2024-08-24

## 2024-08-24 RX ORDER — LIDOCAINE HYDROCHLORIDE 20 MG/ML
JELLY TOPICAL ONCE
OUTPATIENT
Start: 2024-08-24 | End: 2024-08-24

## 2024-08-24 RX ORDER — LIDOCAINE HYDROCHLORIDE 40 MG/ML
SOLUTION TOPICAL ONCE
OUTPATIENT
Start: 2024-08-24 | End: 2024-08-24

## 2024-08-24 RX ORDER — LIDOCAINE 40 MG/G
CREAM TOPICAL ONCE
OUTPATIENT
Start: 2024-08-24 | End: 2024-08-24

## 2024-08-24 RX ORDER — IBUPROFEN 200 MG
TABLET ORAL ONCE
OUTPATIENT
Start: 2024-08-24 | End: 2024-08-24

## 2024-08-24 RX ORDER — LIDOCAINE 50 MG/G
OINTMENT TOPICAL ONCE
OUTPATIENT
Start: 2024-08-24 | End: 2024-08-24

## 2024-08-24 RX ORDER — CLOBETASOL PROPIONATE 0.5 MG/G
OINTMENT TOPICAL ONCE
OUTPATIENT
Start: 2024-08-24 | End: 2024-08-24

## 2024-08-24 RX ORDER — SODIUM CHLOR/HYPOCHLOROUS ACID 0.033 %
SOLUTION, IRRIGATION IRRIGATION ONCE
OUTPATIENT
Start: 2024-08-24 | End: 2024-08-24

## 2024-08-24 RX ORDER — GENTAMICIN SULFATE 1 MG/G
OINTMENT TOPICAL ONCE
OUTPATIENT
Start: 2024-08-24 | End: 2024-08-24

## 2024-08-24 RX ORDER — BETAMETHASONE DIPROPIONATE 0.5 MG/G
CREAM TOPICAL ONCE
OUTPATIENT
Start: 2024-08-24 | End: 2024-08-24

## 2024-09-12 ENCOUNTER — HOSPITAL ENCOUNTER (OUTPATIENT)
Dept: WOUND CARE | Age: 69
Discharge: HOME | End: 2024-09-12
Attending: FAMILY MEDICINE
Payer: MEDICARE

## 2024-09-12 VITALS
RESPIRATION RATE: 16 BRPM | HEART RATE: 59 BPM | BODY MASS INDEX: 21.12 KG/M2 | DIASTOLIC BLOOD PRESSURE: 91 MMHG | TEMPERATURE: 97.7 F | SYSTOLIC BLOOD PRESSURE: 134 MMHG | WEIGHT: 134.6 LBS | HEIGHT: 67 IN

## 2024-09-12 DIAGNOSIS — S31.819A BUTTOCK WOUND, RIGHT, INITIAL ENCOUNTER: Primary | ICD-10-CM

## 2024-09-12 PROCEDURE — 17250 CHEM CAUT OF GRANLTJ TISSUE: CPT

## 2024-09-12 RX ORDER — SODIUM CHLOR/HYPOCHLOROUS ACID 0.033 %
SOLUTION, IRRIGATION IRRIGATION ONCE
OUTPATIENT
Start: 2024-09-12 | End: 2024-09-12

## 2024-09-12 RX ORDER — LIDOCAINE HYDROCHLORIDE 20 MG/ML
JELLY TOPICAL ONCE
OUTPATIENT
Start: 2024-09-12 | End: 2024-09-12

## 2024-09-12 RX ORDER — LIDOCAINE 40 MG/G
CREAM TOPICAL ONCE
OUTPATIENT
Start: 2024-09-12 | End: 2024-09-12

## 2024-09-12 RX ORDER — GINSENG 100 MG
CAPSULE ORAL ONCE
OUTPATIENT
Start: 2024-09-12 | End: 2024-09-12

## 2024-09-12 RX ORDER — SODIUM CHLOR/HYPOCHLOROUS ACID 0.033 %
SOLUTION, IRRIGATION IRRIGATION ONCE
Status: COMPLETED | OUTPATIENT
Start: 2024-09-12 | End: 2024-09-12

## 2024-09-12 RX ORDER — LIDOCAINE HYDROCHLORIDE 20 MG/ML
JELLY TOPICAL ONCE
Status: COMPLETED | OUTPATIENT
Start: 2024-09-12 | End: 2024-09-12

## 2024-09-12 RX ORDER — TRIAMCINOLONE ACETONIDE 1 MG/G
OINTMENT TOPICAL ONCE
OUTPATIENT
Start: 2024-09-12 | End: 2024-09-12

## 2024-09-12 RX ORDER — LIDOCAINE HYDROCHLORIDE 40 MG/ML
SOLUTION TOPICAL ONCE
OUTPATIENT
Start: 2024-09-12 | End: 2024-09-12

## 2024-09-12 RX ORDER — LIDOCAINE 50 MG/G
OINTMENT TOPICAL ONCE
OUTPATIENT
Start: 2024-09-12 | End: 2024-09-12

## 2024-09-12 RX ORDER — BETAMETHASONE DIPROPIONATE 0.5 MG/G
CREAM TOPICAL ONCE
OUTPATIENT
Start: 2024-09-12 | End: 2024-09-12

## 2024-09-12 RX ORDER — BACITRACIN ZINC AND POLYMYXIN B SULFATE 500; 1000 [USP'U]/G; [USP'U]/G
OINTMENT TOPICAL ONCE
OUTPATIENT
Start: 2024-09-12 | End: 2024-09-12

## 2024-09-12 RX ORDER — GENTAMICIN SULFATE 1 MG/G
OINTMENT TOPICAL ONCE
OUTPATIENT
Start: 2024-09-12 | End: 2024-09-12

## 2024-09-12 RX ORDER — MUPIROCIN 20 MG/G
OINTMENT TOPICAL ONCE
OUTPATIENT
Start: 2024-09-12 | End: 2024-09-12

## 2024-09-12 RX ORDER — SILVER SULFADIAZINE 10 MG/G
CREAM TOPICAL ONCE
OUTPATIENT
Start: 2024-09-12 | End: 2024-09-12

## 2024-09-12 RX ORDER — CLOBETASOL PROPIONATE 0.5 MG/G
OINTMENT TOPICAL ONCE
OUTPATIENT
Start: 2024-09-12 | End: 2024-09-12

## 2024-09-12 RX ORDER — NEOMYCIN/BACITRACIN/POLYMYXINB 3.5-400-5K
OINTMENT (GRAM) TOPICAL ONCE
OUTPATIENT
Start: 2024-09-12 | End: 2024-09-12

## 2024-09-12 RX ADMIN — Medication: at 14:27

## 2024-09-12 RX ADMIN — LIDOCAINE HYDROCHLORIDE: 20 JELLY TOPICAL at 14:26

## 2024-09-20 RX ORDER — ACETAMINOPHEN 500 MG
500 TABLET ORAL EVERY 6 HOURS PRN
COMMUNITY
End: 2024-09-20

## 2024-09-20 RX ORDER — LOSARTAN POTASSIUM 100 MG/1
100 TABLET ORAL DAILY
COMMUNITY

## 2024-09-20 RX ORDER — NIFEDIPINE 30 MG/1
30 TABLET, EXTENDED RELEASE ORAL DAILY
COMMUNITY
Start: 2024-09-04 | End: 2024-10-04

## 2024-09-20 RX ORDER — LATANOPROST 50 UG/ML
1 SOLUTION/ DROPS OPHTHALMIC NIGHTLY
COMMUNITY

## 2024-09-20 RX ORDER — ASPIRIN 81 MG/1
81 TABLET ORAL DAILY
COMMUNITY

## 2024-09-20 NOTE — PERIOP NOTE
Phone pre-assessment completed.    Verified name & . Order to obtain consent not found in EHR, however patient verifies case posting.    Type 2 surgery,  assessment complete.  Orders not received.    Labs per surgeon: unknown  Labs per anesthesia protocol: HGB dos, no current transportation, unable to sit due to current wound, daughter will bring to surgery    Pt denies h/o heart disease, however records [facility] cardiac cath with stent placement by Dr Stephens at [facility] Pt seems historically accurate with recalling all other diagnoses and surgeries. Pt denies ever having stents in her heart. Pt does take aspriin 81 mg. Previous EKG 24 and 24 sent to anesthesiologist for review. Chart marked for charge nurse.    Patient answered medical/surgical history questions at their best of ability. All prior to admission medications documented in EPIC.    Patient instructed to continue all prescribed medications unless otherwise instructed below:    Prescription meds to hold: none.     Please stop all vitamins & supplements 7 days prior to surgery and stop all NSAIDS (such as: Excedrin/BC & Goody Powder, ibuprofen/Motrin/Advil, naproxen/Aleve) 5 days before your surgery. Should you have a surgery date that does not allow for the amount of time instructed above, please stop taking vitamins, supplements, and NSAIDS IMMEDIATELY.    Patient instructed to take ONLY the following medications day of surgery per anesthesia guidelines with sip of water: aspirin 81 mg, farxiga, nifedipine, omeprazole, oxycodone-acetaminophen and all eye drops .Continue all prescription medication the day/night prior to surgery unless other wise instructed above.    Instructed on the following:    > Arrive at [address] [place] [address] (enter at front entrance by [address]). Check in on the left at the Admissions office.    >Time of arrival to be called the day before by 1700  > NPO after midnight

## 2024-10-04 ENCOUNTER — ANESTHESIA (OUTPATIENT)
Dept: SURGERY | Age: 69
End: 2024-10-04
Payer: MEDICARE

## 2024-10-04 ENCOUNTER — HOSPITAL ENCOUNTER (OUTPATIENT)
Age: 69
Setting detail: OUTPATIENT SURGERY
Discharge: HOME | End: 2024-10-04
Attending: SURGERY | Admitting: SURGERY
Payer: MEDICARE

## 2024-10-04 ENCOUNTER — ANESTHESIA EVENT (OUTPATIENT)
Dept: SURGERY | Age: 69
End: 2024-10-04
Payer: MEDICARE

## 2024-10-04 VITALS
HEART RATE: 87 BPM | DIASTOLIC BLOOD PRESSURE: 71 MMHG | BODY MASS INDEX: 22.29 KG/M2 | TEMPERATURE: 98 F | OXYGEN SATURATION: 97 % | WEIGHT: 142 LBS | SYSTOLIC BLOOD PRESSURE: 167 MMHG | RESPIRATION RATE: 15 BRPM | HEIGHT: 67 IN

## 2024-10-04 LAB — HGB BLD-MCNC: 9.7 G/DL (ref 11.7–15.4)

## 2024-10-04 PROCEDURE — 7100000010 HC PHASE II RECOVERY - FIRST 15 MIN: Performed by: SURGERY

## 2024-10-04 PROCEDURE — 3700000001 HC ADD 15 MINUTES (ANESTHESIA): Performed by: SURGERY

## 2024-10-04 PROCEDURE — 6370000000 HC RX 637 (ALT 250 FOR IP): Performed by: ANESTHESIOLOGY

## 2024-10-04 PROCEDURE — 85018 HEMOGLOBIN: CPT

## 2024-10-04 PROCEDURE — 2500000003 HC RX 250 WO HCPCS: Performed by: SURGERY

## 2024-10-04 PROCEDURE — 6360000002 HC RX W HCPCS: Performed by: NURSE ANESTHETIST, CERTIFIED REGISTERED

## 2024-10-04 PROCEDURE — 7100000000 HC PACU RECOVERY - FIRST 15 MIN: Performed by: SURGERY

## 2024-10-04 PROCEDURE — 3600000012 HC SURGERY LEVEL 2 ADDTL 15MIN: Performed by: SURGERY

## 2024-10-04 PROCEDURE — 2500000003 HC RX 250 WO HCPCS: Performed by: NURSE ANESTHETIST, CERTIFIED REGISTERED

## 2024-10-04 PROCEDURE — 6360000002 HC RX W HCPCS: Performed by: SURGERY

## 2024-10-04 PROCEDURE — 7100000001 HC PACU RECOVERY - ADDTL 15 MIN: Performed by: SURGERY

## 2024-10-04 PROCEDURE — 6360000002 HC RX W HCPCS: Performed by: ANESTHESIOLOGY

## 2024-10-04 PROCEDURE — 3700000000 HC ANESTHESIA ATTENDED CARE: Performed by: SURGERY

## 2024-10-04 PROCEDURE — 3600000002 HC SURGERY LEVEL 2 BASE: Performed by: SURGERY

## 2024-10-04 PROCEDURE — 2580000003 HC RX 258: Performed by: ANESTHESIOLOGY

## 2024-10-04 PROCEDURE — 2709999900 HC NON-CHARGEABLE SUPPLY: Performed by: SURGERY

## 2024-10-04 RX ORDER — LIDOCAINE HYDROCHLORIDE 10 MG/ML
1 INJECTION, SOLUTION INFILTRATION; PERINEURAL
Status: DISCONTINUED | OUTPATIENT
Start: 2024-10-04 | End: 2024-10-04 | Stop reason: HOSPADM

## 2024-10-04 RX ORDER — MIDAZOLAM HYDROCHLORIDE 2 MG/2ML
2 INJECTION, SOLUTION INTRAMUSCULAR; INTRAVENOUS
Status: DISCONTINUED | OUTPATIENT
Start: 2024-10-04 | End: 2024-10-04 | Stop reason: HOSPADM

## 2024-10-04 RX ORDER — HYDROMORPHONE HYDROCHLORIDE 2 MG/ML
0.5 INJECTION, SOLUTION INTRAMUSCULAR; INTRAVENOUS; SUBCUTANEOUS EVERY 5 MIN PRN
Status: COMPLETED | OUTPATIENT
Start: 2024-10-04 | End: 2024-10-04

## 2024-10-04 RX ORDER — LIDOCAINE HYDROCHLORIDE 10 MG/ML
INJECTION, SOLUTION INFILTRATION; PERINEURAL PRN
Status: DISCONTINUED | OUTPATIENT
Start: 2024-10-04 | End: 2024-10-04 | Stop reason: ALTCHOICE

## 2024-10-04 RX ORDER — NALOXONE HYDROCHLORIDE 0.4 MG/ML
INJECTION, SOLUTION INTRAMUSCULAR; INTRAVENOUS; SUBCUTANEOUS PRN
Status: DISCONTINUED | OUTPATIENT
Start: 2024-10-04 | End: 2024-10-04 | Stop reason: HOSPADM

## 2024-10-04 RX ORDER — DEXAMETHASONE SODIUM PHOSPHATE 4 MG/ML
INJECTION, SOLUTION INTRA-ARTICULAR; INTRALESIONAL; INTRAMUSCULAR; INTRAVENOUS; SOFT TISSUE
Status: DISCONTINUED | OUTPATIENT
Start: 2024-10-04 | End: 2024-10-04 | Stop reason: SDUPTHER

## 2024-10-04 RX ORDER — SODIUM CHLORIDE 9 MG/ML
INJECTION, SOLUTION INTRAVENOUS PRN
Status: DISCONTINUED | OUTPATIENT
Start: 2024-10-04 | End: 2024-10-04 | Stop reason: HOSPADM

## 2024-10-04 RX ORDER — ONDANSETRON 2 MG/ML
INJECTION INTRAMUSCULAR; INTRAVENOUS
Status: DISCONTINUED | OUTPATIENT
Start: 2024-10-04 | End: 2024-10-04 | Stop reason: SDUPTHER

## 2024-10-04 RX ORDER — SODIUM CHLORIDE, SODIUM LACTATE, POTASSIUM CHLORIDE, CALCIUM CHLORIDE 600; 310; 30; 20 MG/100ML; MG/100ML; MG/100ML; MG/100ML
INJECTION, SOLUTION INTRAVENOUS CONTINUOUS
Status: DISCONTINUED | OUTPATIENT
Start: 2024-10-04 | End: 2024-10-04 | Stop reason: HOSPADM

## 2024-10-04 RX ORDER — EPINEPHRINE 1 MG/ML(1)
AMPUL (ML) INJECTION PRN
Status: DISCONTINUED | OUTPATIENT
Start: 2024-10-04 | End: 2024-10-04 | Stop reason: ALTCHOICE

## 2024-10-04 RX ORDER — HALOPERIDOL 5 MG/ML
1 INJECTION INTRAMUSCULAR
Status: DISCONTINUED | OUTPATIENT
Start: 2024-10-04 | End: 2024-10-04 | Stop reason: HOSPADM

## 2024-10-04 RX ORDER — FENTANYL CITRATE 50 UG/ML
100 INJECTION, SOLUTION INTRAMUSCULAR; INTRAVENOUS
Status: DISCONTINUED | OUTPATIENT
Start: 2024-10-04 | End: 2024-10-04 | Stop reason: HOSPADM

## 2024-10-04 RX ORDER — ACETAMINOPHEN 500 MG
1000 TABLET ORAL ONCE
Status: COMPLETED | OUTPATIENT
Start: 2024-10-04 | End: 2024-10-04

## 2024-10-04 RX ORDER — SODIUM CHLORIDE 0.9 % (FLUSH) 0.9 %
5-40 SYRINGE (ML) INJECTION EVERY 12 HOURS SCHEDULED
Status: DISCONTINUED | OUTPATIENT
Start: 2024-10-04 | End: 2024-10-04 | Stop reason: HOSPADM

## 2024-10-04 RX ORDER — EPHEDRINE SULFATE 5 MG/ML
INJECTION INTRAVENOUS
Status: DISCONTINUED | OUTPATIENT
Start: 2024-10-04 | End: 2024-10-04 | Stop reason: SDUPTHER

## 2024-10-04 RX ORDER — IPRATROPIUM BROMIDE AND ALBUTEROL SULFATE 2.5; .5 MG/3ML; MG/3ML
1 SOLUTION RESPIRATORY (INHALATION)
Status: DISCONTINUED | OUTPATIENT
Start: 2024-10-04 | End: 2024-10-04 | Stop reason: HOSPADM

## 2024-10-04 RX ORDER — OXYCODONE HYDROCHLORIDE 5 MG/1
5 TABLET ORAL
Status: COMPLETED | OUTPATIENT
Start: 2024-10-04 | End: 2024-10-04

## 2024-10-04 RX ORDER — SODIUM CHLORIDE 0.9 % (FLUSH) 0.9 %
5-40 SYRINGE (ML) INJECTION PRN
Status: DISCONTINUED | OUTPATIENT
Start: 2024-10-04 | End: 2024-10-04 | Stop reason: HOSPADM

## 2024-10-04 RX ORDER — PROPOFOL 10 MG/ML
INJECTION, EMULSION INTRAVENOUS
Status: DISCONTINUED | OUTPATIENT
Start: 2024-10-04 | End: 2024-10-04 | Stop reason: SDUPTHER

## 2024-10-04 RX ORDER — SUCCINYLCHOLINE/SOD CL,ISO/PF 200MG/10ML
SYRINGE (ML) INTRAVENOUS
Status: DISCONTINUED | OUTPATIENT
Start: 2024-10-04 | End: 2024-10-04 | Stop reason: SDUPTHER

## 2024-10-04 RX ORDER — PROCHLORPERAZINE EDISYLATE 5 MG/ML
5 INJECTION INTRAMUSCULAR; INTRAVENOUS
Status: DISCONTINUED | OUTPATIENT
Start: 2024-10-04 | End: 2024-10-04 | Stop reason: HOSPADM

## 2024-10-04 RX ORDER — LIDOCAINE HYDROCHLORIDE 20 MG/ML
INJECTION, SOLUTION EPIDURAL; INFILTRATION; INTRACAUDAL; PERINEURAL
Status: DISCONTINUED | OUTPATIENT
Start: 2024-10-04 | End: 2024-10-04 | Stop reason: SDUPTHER

## 2024-10-04 RX ADMIN — HYDROMORPHONE HYDROCHLORIDE 0.5 MG: 2 INJECTION INTRAMUSCULAR; INTRAVENOUS; SUBCUTANEOUS at 11:34

## 2024-10-04 RX ADMIN — ACETAMINOPHEN 1000 MG: 500 TABLET, FILM COATED ORAL at 09:03

## 2024-10-04 RX ADMIN — HYDROMORPHONE HYDROCHLORIDE 0.5 MG: 2 INJECTION INTRAMUSCULAR; INTRAVENOUS; SUBCUTANEOUS at 11:16

## 2024-10-04 RX ADMIN — PROPOFOL 150 MG: 10 INJECTION, EMULSION INTRAVENOUS at 09:44

## 2024-10-04 RX ADMIN — OXYCODONE HYDROCHLORIDE 5 MG: 5 TABLET ORAL at 11:59

## 2024-10-04 RX ADMIN — LIDOCAINE HYDROCHLORIDE 60 MG: 20 INJECTION, SOLUTION EPIDURAL; INFILTRATION; INTRACAUDAL; PERINEURAL at 09:44

## 2024-10-04 RX ADMIN — HYDROMORPHONE HYDROCHLORIDE 0.5 MG: 2 INJECTION INTRAMUSCULAR; INTRAVENOUS; SUBCUTANEOUS at 11:22

## 2024-10-04 RX ADMIN — EPHEDRINE SULFATE 10 MG: 5 INJECTION INTRAVENOUS at 10:17

## 2024-10-04 RX ADMIN — SODIUM CHLORIDE, POTASSIUM CHLORIDE, SODIUM LACTATE AND CALCIUM CHLORIDE: 600; 310; 30; 20 INJECTION, SOLUTION INTRAVENOUS at 09:10

## 2024-10-04 RX ADMIN — Medication 140 MG: at 09:45

## 2024-10-04 RX ADMIN — Medication 2000 MG: at 09:38

## 2024-10-04 RX ADMIN — DEXAMETHASONE SODIUM PHOSPHATE 4 MG: 4 INJECTION, SOLUTION INTRAMUSCULAR; INTRAVENOUS at 10:09

## 2024-10-04 RX ADMIN — ONDANSETRON 4 MG: 2 INJECTION INTRAMUSCULAR; INTRAVENOUS at 10:19

## 2024-10-04 RX ADMIN — HYDROMORPHONE HYDROCHLORIDE 0.5 MG: 2 INJECTION INTRAMUSCULAR; INTRAVENOUS; SUBCUTANEOUS at 11:27

## 2024-10-04 ASSESSMENT — PAIN SCALES - GENERAL
PAINLEVEL_OUTOF10: 9
PAINLEVEL_OUTOF10: 10
PAINLEVEL_OUTOF10: 8
PAINLEVEL_OUTOF10: 6

## 2024-10-04 ASSESSMENT — PAIN DESCRIPTION - LOCATION
LOCATION: BUTTOCKS
LOCATION: BUTTOCKS

## 2024-10-04 NOTE — OP NOTE
Operative Note      Patient: Venita Calvillo  YOB: 1955  MRN: [id number]    Date of Procedure: 10/4/2024    Pre-Op Diagnosis:  Sacral wound    Post-Op Diagnosis:   Same    Procedure:     Excision of sacral wound 20 x 2 cm  Fasciocutaneous flap closure of sacral wound      Surgeon(s):  Marc Lainez MD    Assistant:   * No surgical staff found *    Anesthesia: General    Estimated Blood Loss (mL): 25 ml     Complications:     Specimens:   * No specimens in log *    Implants:  * No implants in log *      Drains: * No LDAs found *    Detailed Description of Procedure:     Prior to the procedure the patient was met in holding. Once again a discussion of the risks, benefits and alternatives was conducted including but not limited to bleeding, infection, failure of the surgery, need for further procedures, disappointing or unacceptable cosmesis, damage to adjacent structures was conducted. The patient again affirmed understanding and consent. The patient was marked in the upright and relaxed position. Patient brought to the OR, surgical timeout performed and anesthesia induced. Patient positioned and prepped and draped.    Chronic wound excised in its entirety. Dissection carried down to the gluteal fascia. This was elevated bilaterally. Wound irrigated and hemostasis obtained.     Fascia advanced into midline to angélica the gluteal cleft. Wound irrigated and hemostasis obtained. Closure completed with 1 stratafix pdo at the fascia. 1-0 buried vertical mattress at the dermis and running prolene.     Dressed with xeroform and gauze.

## 2024-10-04 NOTE — DISCHARGE INSTRUCTIONS
Keep dressing in place and dry 24 hours.   May shower in 48 hours.  Avoid any sitting.     ACTIVITY  As tolerated and as directed by your doctor.   Bathe or shower as directed by your doctor.     DIET  Clear liquids until no nausea or vomiting; then light diet for the first day.  Advance to regular diet on second day, unless your doctor orders otherwise.   If nausea and vomiting continues, call your doctor.     PAIN  Take pain medication as directed by your doctor.   Call your doctor if pain is NOT relieved by medication.   DO NOT take aspirin of blood thinners unless directed by your doctor.     MEDICATION INTERACTION:During your procedure you potentially received a medication or medications which may reduce the effectiveness of oral contraceptives. Please consider other forms of contraception for 1 month following your procedure if you are currently using oral contraceptives as your primary form of birth control. In addition to this, we recommend continuing your oral contraceptive as prescribed, unless otherwise instructed by your physician, during this time      CALL YOUR DOCTOR IF   Excessive bleeding that does not stop after holding pressure over the area  Temperature of 101 degrees F or above  Excessive redness, swelling or bruising, and/ or green or yellow, smelly discharge from incision    After general anesthesia or intravenous sedation, for 24 hours or while taking prescription Narcotics:  Limit your activities  A responsible adult needs to be with you for the next 24 hours  Do not drive and operate hazardous machinery  Do not make important personal or business decisions  Do not drink alcoholic beverages  If you have not urinated within 8 hours after discharge, and you are experiencing discomfort from urinary retention, please go to the nearest ED.  If you have sleep apnea and have a CPAP machine, please use it for all naps and sleeping.  Please use caution when taking narcotics and any of your home

## 2024-10-04 NOTE — ANESTHESIA POSTPROCEDURE EVALUATION
Department of Anesthesiology  Postprocedure Note    Patient: Venita Calvillo  MRN: [id number]  YOB: 1955  Date of evaluation: 10/4/2024    Procedure Summary       Date: 10/04/24 Room / Location: [facility] OP OR 06 / [facility]    Anesthesia Start: 0938 Anesthesia Stop: 1103    Procedure: EXCISION SACRAL PRESSURE SORE AD FLAP CLOSURE (Buttocks) Diagnosis:       Pressure injury of skin of sacral region, unspecified injury stage      (Pressure injury of skin of sacral region, unspecified injury stage [L89.159])    Surgeons: Marc Lainez MD Responsible Provider: Deven rFank MD    Anesthesia Type: General ASA Status: 3            Anesthesia Type: General    Jevon Phase I: Jevon Score: 8    Jevon Phase II:      Anesthesia Post Evaluation    Patient location during evaluation: PACU  Patient participation: complete - patient participated  Level of consciousness: awake  Airway patency: patent  Nausea & Vomiting: no nausea  Cardiovascular status: hemodynamically stable  Respiratory status: acceptable, nonlabored ventilation and spontaneous ventilation  Hydration status: stable  Multimodal analgesia pain management approach  Pain management: adequate    No notable events documented.

## 2024-10-07 ENCOUNTER — HOSPITAL ENCOUNTER (EMERGENCY)
Age: 69
Discharge: HOME | End: 2024-10-07
Attending: STUDENT IN AN ORGANIZED HEALTH CARE EDUCATION/TRAINING PROGRAM
Payer: MEDICARE

## 2024-10-07 VITALS
RESPIRATION RATE: 17 BRPM | TEMPERATURE: 98.1 F | OXYGEN SATURATION: 100 % | BODY MASS INDEX: 22.29 KG/M2 | WEIGHT: 142 LBS | HEIGHT: 67 IN | DIASTOLIC BLOOD PRESSURE: 71 MMHG | HEART RATE: 50 BPM | SYSTOLIC BLOOD PRESSURE: 164 MMHG

## 2024-10-07 DIAGNOSIS — T81.31XA DEHISCENCE OF OPERATIVE WOUND, INITIAL ENCOUNTER: Primary | ICD-10-CM

## 2024-10-07 LAB
ALBUMIN SERPL-MCNC: 3.6 G/DL (ref 3.2–4.6)
ALBUMIN/GLOB SERPL: 0.9 (ref 1–1.9)
ALP SERPL-CCNC: 156 U/L (ref 35–104)
ALT SERPL-CCNC: 10 U/L (ref 8–45)
ANION GAP SERPL CALC-SCNC: 9 MMOL/L (ref 9–18)
AST SERPL-CCNC: 18 U/L (ref 15–37)
BASOPHILS # BLD: 0 K/UL (ref 0–0.2)
BASOPHILS NFR BLD: 0 % (ref 0–2)
BILIRUB SERPL-MCNC: 0.4 MG/DL (ref 0–1.2)
BUN SERPL-MCNC: 17 MG/DL (ref 8–23)
CALCIUM SERPL-MCNC: 9.6 MG/DL (ref 8.8–10.2)
CHLORIDE SERPL-SCNC: 107 MMOL/L (ref 98–107)
CO2 SERPL-SCNC: 26 MMOL/L (ref 20–28)
CREAT SERPL-MCNC: 1.3 MG/DL (ref 0.6–1.1)
DIFFERENTIAL METHOD BLD: ABNORMAL
EOSINOPHIL # BLD: 0.3 K/UL (ref 0–0.8)
EOSINOPHIL NFR BLD: 5 % (ref 0.5–7.8)
ERYTHROCYTE [DISTWIDTH] IN BLOOD BY AUTOMATED COUNT: 18.3 % (ref 11.9–14.6)
GLOBULIN SER CALC-MCNC: 4.2 G/DL (ref 2.3–3.5)
GLUCOSE SERPL-MCNC: 93 MG/DL (ref 70–99)
HCT VFR BLD AUTO: 35.4 % (ref 35.8–46.3)
HGB BLD-MCNC: 10.2 G/DL (ref 11.7–15.4)
IMM GRANULOCYTES # BLD AUTO: 0 K/UL (ref 0–0.5)
IMM GRANULOCYTES NFR BLD AUTO: 1 % (ref 0–5)
LYMPHOCYTES # BLD: 1.5 K/UL (ref 0.5–4.6)
LYMPHOCYTES NFR BLD: 22 % (ref 13–44)
MCH RBC QN AUTO: 22.1 PG (ref 26.1–32.9)
MCHC RBC AUTO-ENTMCNC: 28.8 G/DL (ref 31.4–35)
MCV RBC AUTO: 76.6 FL (ref 82–102)
MONOCYTES # BLD: 0.4 K/UL (ref 0.1–1.3)
MONOCYTES NFR BLD: 6 % (ref 4–12)
NEUTS SEG # BLD: 4.3 K/UL (ref 1.7–8.2)
NEUTS SEG NFR BLD: 66 % (ref 43–78)
NRBC # BLD: 0 K/UL (ref 0–0.2)
PLATELET # BLD AUTO: 250 K/UL (ref 150–450)
PMV BLD AUTO: 10.2 FL (ref 9.4–12.3)
POTASSIUM SERPL-SCNC: 4.2 MMOL/L (ref 3.5–5.1)
PROT SERPL-MCNC: 7.8 G/DL (ref 6.3–8.2)
RBC # BLD AUTO: 4.62 M/UL (ref 4.05–5.2)
SODIUM SERPL-SCNC: 141 MMOL/L (ref 136–145)
WBC # BLD AUTO: 6.5 K/UL (ref 4.3–11.1)

## 2024-10-07 PROCEDURE — 96374 THER/PROPH/DIAG INJ IV PUSH: CPT

## 2024-10-07 PROCEDURE — 6360000002 HC RX W HCPCS

## 2024-10-07 PROCEDURE — 85025 COMPLETE CBC W/AUTO DIFF WBC: CPT

## 2024-10-07 PROCEDURE — 80053 COMPREHEN METABOLIC PANEL: CPT

## 2024-10-07 PROCEDURE — 99284 EMERGENCY DEPT VISIT MOD MDM: CPT

## 2024-10-07 PROCEDURE — 96375 TX/PRO/DX INJ NEW DRUG ADDON: CPT

## 2024-10-07 PROCEDURE — 6360000002 HC RX W HCPCS: Performed by: STUDENT IN AN ORGANIZED HEALTH CARE EDUCATION/TRAINING PROGRAM

## 2024-10-07 RX ORDER — ONDANSETRON 2 MG/ML
4 INJECTION INTRAMUSCULAR; INTRAVENOUS
Status: COMPLETED | OUTPATIENT
Start: 2024-10-07 | End: 2024-10-07

## 2024-10-07 RX ORDER — MORPHINE SULFATE 4 MG/ML
4 INJECTION, SOLUTION INTRAMUSCULAR; INTRAVENOUS
Status: COMPLETED | OUTPATIENT
Start: 2024-10-07 | End: 2024-10-07

## 2024-10-07 RX ORDER — ONDANSETRON 2 MG/ML
INJECTION INTRAMUSCULAR; INTRAVENOUS
Status: COMPLETED
Start: 2024-10-07 | End: 2024-10-07

## 2024-10-07 RX ADMIN — ONDANSETRON 4 MG: 2 INJECTION INTRAMUSCULAR; INTRAVENOUS at 15:02

## 2024-10-07 RX ADMIN — MORPHINE SULFATE 4 MG: 4 INJECTION INTRAVENOUS at 15:02

## 2024-10-07 ASSESSMENT — PAIN SCALES - GENERAL
PAINLEVEL_OUTOF10: 10
PAINLEVEL_OUTOF10: 10

## 2024-10-07 ASSESSMENT — PAIN DESCRIPTION - PAIN TYPE: TYPE: ACUTE PAIN

## 2024-10-07 ASSESSMENT — PAIN DESCRIPTION - LOCATION: LOCATION: RECTUM

## 2024-10-07 ASSESSMENT — PAIN DESCRIPTION - FREQUENCY: FREQUENCY: CONTINUOUS

## 2024-10-07 ASSESSMENT — LIFESTYLE VARIABLES
HOW OFTEN DO YOU HAVE A DRINK CONTAINING ALCOHOL: NEVER
HOW MANY STANDARD DRINKS CONTAINING ALCOHOL DO YOU HAVE ON A TYPICAL DAY: PATIENT DOES NOT DRINK

## 2024-10-07 ASSESSMENT — PAIN DESCRIPTION - DESCRIPTORS: DESCRIPTORS: ACHING;SHARP

## 2024-10-07 ASSESSMENT — PAIN - FUNCTIONAL ASSESSMENT: PAIN_FUNCTIONAL_ASSESSMENT: 0-10

## 2024-10-07 NOTE — ED TRIAGE NOTES
Patient arrived via EMS from home complaining of surgical site bleeding. Patient had surgery on 10/4 at this hospital by plastic surgeon, pt is not sure of the procedure but has a surgical incision to her perirectal area and states she had significant bleeding onset 1 hour ago.

## 2024-10-07 NOTE — ED PROVIDER NOTES
by meds    IBS (irritable bowel syndrome)     Kidney stones     Other ill-defined conditions(799.89)     \"has no sweat glands\", sickle  cell    Primary osteoarthritis of both knees     Secondary hyperparathyroidism (HCC)     Sickle cell disease (HCC)     last crisis 4/14, well controlled per pt.    Stented coronary artery     PT DENIES & quotes dates for all surgery,however :  [facility]/Dr Stephens 2014, no records found except on    Uncomplicated opioid dependence (HCC)         Past Surgical History:   Procedure Laterality Date    ABSCESS DRAINAGE  11/27/2018    CHOLECYSTECTOMY  2005    COLONOSCOPY  02/02/2016    COLONOSCOPY  2018    CYSTOSCOPY  2009    CYSTOSCOPY      CYSTOSCOPY W/ LASER LITHOTRIPSY  04/22/2010    CYSTOSCOPY W/ URETERAL STENT PLACEMENT  03/26/2010    ESOPHAGOGASTRODUODENOSCOPY  05/18/2017    ESOPHAGOGASTRODUODENOSCOPY  08/20/2019    ESOPHAGOGASTRODUODENOSCOPY  11/17/2022    HIATAL HERNIA REPAIR  2005    HYSTERECTOMY (CERVIX STATUS UNKNOWN)  1999    [facility]    HYSTERECTOMY, TOTAL ABDOMINAL (CERVIX REMOVED)      LITHOTRIPSY  07/2019    NISSEN FUNDOPLICATION  2006    second hiatal hernia repaired N[facility]    OTHER SURGICAL HISTORY      groin, buttocks, axilla- sweat glands    PRESSURE ULCER DEBRIDEMENT N/A 10/4/2024    EXCISION SACRAL PRESSURE SORE AD FLAP CLOSURE performed by Marc Lainez MD at [facility]    SKIN LESION EXCISION      OR EXC SWEAT GLAND LESN AXILL,SIMPL-Hidradenitis Suppurativa    UROLOGICAL SURGERY  2016    LAPAROSCOPIC PYLORAPLASTY        Social History     Socioeconomic History    Marital status: Single   Tobacco Use    Smoking status: Never    Smokeless tobacco: Never   Substance and Sexual Activity    Alcohol use: No    Drug use: No     Social Determinants of Health     Financial Resource Strain: Low Risk  (6/27/2022)    [facility]    Financial Resource Strain     Difficulty Paying Living Expenses: Patient declined     Difficulty Paying Medical Expenses: No

## 2024-10-07 NOTE — DISCHARGE INSTRUCTIONS
You should continue your Percocet as prescribed by your primary care provider.  Further prescriptions for narcotic medication cannot be provided through the ER at this time.  Arrange follow-up with Dr. Fatou heller on Thursday.  Change dressing daily as discussed

## 2024-12-08 ENCOUNTER — APPOINTMENT (OUTPATIENT)
Dept: GENERAL RADIOLOGY | Age: 69
End: 2024-12-08
Payer: MEDICARE

## 2024-12-08 ENCOUNTER — HOSPITAL ENCOUNTER (EMERGENCY)
Age: 69
Discharge: HOME | End: 2024-12-08
Attending: EMERGENCY MEDICINE
Payer: MEDICARE

## 2024-12-08 VITALS
DIASTOLIC BLOOD PRESSURE: 70 MMHG | HEART RATE: 59 BPM | BODY MASS INDEX: 21.5 KG/M2 | HEIGHT: 67 IN | TEMPERATURE: 98.1 F | OXYGEN SATURATION: 100 % | RESPIRATION RATE: 16 BRPM | SYSTOLIC BLOOD PRESSURE: 179 MMHG | WEIGHT: 137 LBS

## 2024-12-08 DIAGNOSIS — M79.10 MYALGIA: ICD-10-CM

## 2024-12-08 DIAGNOSIS — G89.29 CHRONIC MIDLINE LOW BACK PAIN WITHOUT SCIATICA: ICD-10-CM

## 2024-12-08 DIAGNOSIS — M54.50 CHRONIC MIDLINE LOW BACK PAIN WITHOUT SCIATICA: ICD-10-CM

## 2024-12-08 DIAGNOSIS — I16.0 HYPERTENSIVE URGENCY: ICD-10-CM

## 2024-12-08 DIAGNOSIS — R07.9 ACUTE CHEST PAIN: Primary | ICD-10-CM

## 2024-12-08 DIAGNOSIS — S31.000A WOUND OF SACRAL REGION, INITIAL ENCOUNTER: ICD-10-CM

## 2024-12-08 DIAGNOSIS — Z91.148 NON COMPLIANCE W MEDICATION REGIMEN: ICD-10-CM

## 2024-12-08 LAB
ALBUMIN SERPL-MCNC: 3.7 G/DL (ref 3.2–4.6)
ALBUMIN/GLOB SERPL: 0.6 (ref 1–1.9)
ALP SERPL-CCNC: 198 U/L (ref 35–104)
ALT SERPL-CCNC: 8 U/L (ref 8–45)
ANION GAP SERPL CALC-SCNC: 17 MMOL/L (ref 7–16)
AST SERPL-CCNC: 22 U/L (ref 15–37)
BASOPHILS # BLD: 0 K/UL (ref 0–0.2)
BASOPHILS NFR BLD: 0 % (ref 0–2)
BILIRUB SERPL-MCNC: 0.6 MG/DL (ref 0–1.2)
BUN SERPL-MCNC: 23 MG/DL (ref 8–23)
CALCIUM SERPL-MCNC: 10.7 MG/DL (ref 8.8–10.2)
CHLORIDE SERPL-SCNC: 103 MMOL/L (ref 98–107)
CO2 SERPL-SCNC: 19 MMOL/L (ref 20–29)
CREAT SERPL-MCNC: 1.34 MG/DL (ref 0.6–1.1)
DIFFERENTIAL METHOD BLD: ABNORMAL
EKG ATRIAL RATE: 61 BPM
EKG DIAGNOSIS: NORMAL
EKG P AXIS: 71 DEGREES
EKG P-R INTERVAL: 136 MS
EKG Q-T INTERVAL: 418 MS
EKG QRS DURATION: 86 MS
EKG QTC CALCULATION (BAZETT): 420 MS
EKG R AXIS: 67 DEGREES
EKG T AXIS: 61 DEGREES
EKG VENTRICULAR RATE: 61 BPM
EOSINOPHIL # BLD: 0 K/UL (ref 0–0.8)
EOSINOPHIL NFR BLD: 0 % (ref 0.5–7.8)
ERYTHROCYTE [DISTWIDTH] IN BLOOD BY AUTOMATED COUNT: 17.6 % (ref 11.9–14.6)
GLOBULIN SER CALC-MCNC: 5.9 G/DL (ref 2.3–3.5)
GLUCOSE SERPL-MCNC: 117 MG/DL (ref 70–99)
HCT VFR BLD AUTO: 43.7 % (ref 35.8–46.3)
HGB BLD-MCNC: 13.2 G/DL (ref 11.7–15.4)
IMM GRANULOCYTES # BLD AUTO: 0 K/UL (ref 0–0.5)
IMM GRANULOCYTES NFR BLD AUTO: 0 % (ref 0–5)
LYMPHOCYTES # BLD: 1.4 K/UL (ref 0.5–4.6)
LYMPHOCYTES NFR BLD: 15 % (ref 13–44)
MCH RBC QN AUTO: 21.7 PG (ref 26.1–32.9)
MCHC RBC AUTO-ENTMCNC: 30.2 G/DL (ref 31.4–35)
MCV RBC AUTO: 71.8 FL (ref 82–102)
MONOCYTES # BLD: 0.4 K/UL (ref 0.1–1.3)
MONOCYTES NFR BLD: 5 % (ref 4–12)
NEUTS SEG # BLD: 7.6 K/UL (ref 1.7–8.2)
NEUTS SEG NFR BLD: 80 % (ref 43–78)
NRBC # BLD: 0.02 K/UL (ref 0–0.2)
PLATELET # BLD AUTO: 333 K/UL (ref 150–450)
PMV BLD AUTO: 9.5 FL (ref 9.4–12.3)
POTASSIUM SERPL-SCNC: 4.1 MMOL/L (ref 3.5–5.1)
PROT SERPL-MCNC: 9.5 G/DL (ref 6.3–8.2)
RBC # BLD AUTO: 6.09 M/UL (ref 4.05–5.2)
SODIUM SERPL-SCNC: 139 MMOL/L (ref 136–145)
TROPONIN T SERPL HS-MCNC: <6 NG/L (ref 0–14)
TROPONIN T SERPL HS-MCNC: <6 NG/L (ref 0–14)
WBC # BLD AUTO: 9.6 K/UL (ref 4.3–11.1)

## 2024-12-08 PROCEDURE — 85025 COMPLETE CBC W/AUTO DIFF WBC: CPT

## 2024-12-08 PROCEDURE — 2580000003 HC RX 258: Performed by: EMERGENCY MEDICINE

## 2024-12-08 PROCEDURE — 93005 ELECTROCARDIOGRAM TRACING: CPT | Performed by: EMERGENCY MEDICINE

## 2024-12-08 PROCEDURE — 96374 THER/PROPH/DIAG INJ IV PUSH: CPT

## 2024-12-08 PROCEDURE — 96375 TX/PRO/DX INJ NEW DRUG ADDON: CPT

## 2024-12-08 PROCEDURE — 84484 ASSAY OF TROPONIN QUANT: CPT

## 2024-12-08 PROCEDURE — 96361 HYDRATE IV INFUSION ADD-ON: CPT

## 2024-12-08 PROCEDURE — 71046 X-RAY EXAM CHEST 2 VIEWS: CPT

## 2024-12-08 PROCEDURE — 93010 ELECTROCARDIOGRAM REPORT: CPT | Performed by: INTERNAL MEDICINE

## 2024-12-08 PROCEDURE — 6360000002 HC RX W HCPCS: Performed by: EMERGENCY MEDICINE

## 2024-12-08 PROCEDURE — 96376 TX/PRO/DX INJ SAME DRUG ADON: CPT

## 2024-12-08 PROCEDURE — 99285 EMERGENCY DEPT VISIT HI MDM: CPT

## 2024-12-08 PROCEDURE — 80053 COMPREHEN METABOLIC PANEL: CPT

## 2024-12-08 PROCEDURE — 2500000003 HC RX 250 WO HCPCS: Performed by: EMERGENCY MEDICINE

## 2024-12-08 RX ORDER — ONDANSETRON 2 MG/ML
4 INJECTION INTRAMUSCULAR; INTRAVENOUS
Status: COMPLETED | OUTPATIENT
Start: 2024-12-08 | End: 2024-12-08

## 2024-12-08 RX ORDER — HYDRALAZINE HYDROCHLORIDE 20 MG/ML
10 INJECTION INTRAMUSCULAR; INTRAVENOUS
Status: COMPLETED | OUTPATIENT
Start: 2024-12-08 | End: 2024-12-08

## 2024-12-08 RX ORDER — HYDROMORPHONE HYDROCHLORIDE 1 MG/ML
0.5 INJECTION, SOLUTION INTRAMUSCULAR; INTRAVENOUS; SUBCUTANEOUS
Status: COMPLETED | OUTPATIENT
Start: 2024-12-08 | End: 2024-12-08

## 2024-12-08 RX ORDER — LABETALOL HYDROCHLORIDE 5 MG/ML
20 INJECTION, SOLUTION INTRAVENOUS
Status: COMPLETED | OUTPATIENT
Start: 2024-12-08 | End: 2024-12-08

## 2024-12-08 RX ORDER — 0.9 % SODIUM CHLORIDE 0.9 %
1000 INTRAVENOUS SOLUTION INTRAVENOUS
Status: COMPLETED | OUTPATIENT
Start: 2024-12-08 | End: 2024-12-08

## 2024-12-08 RX ADMIN — LABETALOL HYDROCHLORIDE 20 MG: 5 INJECTION INTRAVENOUS at 16:28

## 2024-12-08 RX ADMIN — ONDANSETRON 4 MG: 2 INJECTION, SOLUTION INTRAMUSCULAR; INTRAVENOUS at 14:58

## 2024-12-08 RX ADMIN — HYDROMORPHONE HYDROCHLORIDE 0.5 MG: 1 INJECTION, SOLUTION INTRAMUSCULAR; INTRAVENOUS; SUBCUTANEOUS at 14:58

## 2024-12-08 RX ADMIN — HYDROMORPHONE HYDROCHLORIDE 0.5 MG: 1 INJECTION, SOLUTION INTRAMUSCULAR; INTRAVENOUS; SUBCUTANEOUS at 16:28

## 2024-12-08 RX ADMIN — HYDRALAZINE HYDROCHLORIDE 10 MG: 20 INJECTION INTRAMUSCULAR; INTRAVENOUS at 16:28

## 2024-12-08 RX ADMIN — SODIUM CHLORIDE 1000 ML: 9 INJECTION, SOLUTION INTRAVENOUS at 14:58

## 2024-12-08 ASSESSMENT — PAIN DESCRIPTION - LOCATION
LOCATION: SACRUM
LOCATION: SACRUM

## 2024-12-08 ASSESSMENT — PAIN - FUNCTIONAL ASSESSMENT: PAIN_FUNCTIONAL_ASSESSMENT: 0-10

## 2024-12-08 ASSESSMENT — PAIN SCALES - GENERAL
PAINLEVEL_OUTOF10: 8
PAINLEVEL_OUTOF10: 10
PAINLEVEL_OUTOF10: 10
PAINLEVEL_OUTOF10: 0

## 2024-12-08 NOTE — ED PROVIDER NOTES
(DILAUDID) injection 0.5 mg (0.5 mg IntraVENous Given 12/8/24 1628)       New prescriptions:     Discharge Medication List as of 12/8/2024  5:26 PM           Past History and Complexity:     Past Medical History:   Diagnosis Date    Alpha thalassemia trait     Arthritis     osteo    Asthma     Cataract     left eye    Chronic pain     joint    Chronic renal impairment, stage 3b (HCC) 12/22/2017    COPD (chronic obstructive pulmonary disease) (HCC)     denies, stated in multiple medical records as diagnosed    Cyst of right kidney     U/S    Dysphagia     Fibromyalgia     Gastrointestinal disorder     ulcer    GERD (gastroesophageal reflux disease)     daily rx, 3 pillows, states h/o hiatal hernia repair x 2 ([facility])    Glaucoma     History of hepatitis C 2018    treated x 6 months    History of peptic ulcer 1985    Hypertension     controlled by meds    IBS (irritable bowel syndrome)     Kidney stones     Other ill-defined conditions(799.89)     \"has no sweat glands\", sickle  cell    Primary osteoarthritis of both knees     Secondary hyperparathyroidism (HCC)     Sickle cell disease (HCC)     last crisis 4/14, well controlled per pt.    Stented coronary artery     PT DENIES & quotes dates for all surgery,however :  [facility]/Dr Stephens 2014, no records found except on    Uncomplicated opioid dependence (HCC)         Past Surgical History:   Procedure Laterality Date    ABSCESS DRAINAGE  11/27/2018    CHOLECYSTECTOMY  2005    COLONOSCOPY  02/02/2016    COLONOSCOPY  2018    CYSTOSCOPY  2009    CYSTOSCOPY      CYSTOSCOPY W/ LASER LITHOTRIPSY  04/22/2010    CYSTOSCOPY W/ URETERAL STENT PLACEMENT  03/26/2010    ESOPHAGOGASTRODUODENOSCOPY  05/18/2017    ESOPHAGOGASTRODUODENOSCOPY  08/20/2019    ESOPHAGOGASTRODUODENOSCOPY  11/17/2022    HIATAL HERNIA REPAIR  2005    HYSTERECTOMY (CERVIX STATUS UNKNOWN)  1999    [facility]    HYSTERECTOMY, TOTAL ABDOMINAL (CERVIX REMOVED)      LITHOTRIPSY  07/2019    NISSEN FUNDOPLICATION  2006

## 2024-12-08 NOTE — ED TRIAGE NOTES
Patient with multiple complaints, reports chest pain since Friday last week. Patient reports knee pain and states buttocks pain after having a surgery in October

## 2025-03-21 ENCOUNTER — HOSPITAL ENCOUNTER (OUTPATIENT)
Dept: WOUND CARE | Age: 70
Discharge: HOME | End: 2025-03-21

## 2025-03-21 VITALS
TEMPERATURE: 98.2 F | RESPIRATION RATE: 18 BRPM | HEIGHT: 67 IN | SYSTOLIC BLOOD PRESSURE: 134 MMHG | WEIGHT: 128 LBS | HEART RATE: 63 BPM | DIASTOLIC BLOOD PRESSURE: 84 MMHG | BODY MASS INDEX: 20.09 KG/M2 | OXYGEN SATURATION: 100 %

## 2025-03-21 DIAGNOSIS — L02.31 LEFT BUTTOCK ABSCESS: Primary | ICD-10-CM

## 2025-03-21 PROCEDURE — 99213 OFFICE O/P EST LOW 20 MIN: CPT | Performed by: FAMILY MEDICINE

## 2025-03-21 PROCEDURE — 99213 OFFICE O/P EST LOW 20 MIN: CPT

## 2025-03-21 RX ORDER — TRIAMCINOLONE ACETONIDE 1 MG/G
OINTMENT TOPICAL PRN
OUTPATIENT
Start: 2025-03-21

## 2025-03-21 RX ORDER — MUPIROCIN 20 MG/G
OINTMENT TOPICAL PRN
OUTPATIENT
Start: 2025-03-21

## 2025-03-21 RX ORDER — GINSENG 100 MG
CAPSULE ORAL PRN
OUTPATIENT
Start: 2025-03-21

## 2025-03-21 RX ORDER — SODIUM CHLOR/HYPOCHLOROUS ACID 0.033 %
SOLUTION, IRRIGATION IRRIGATION PRN
OUTPATIENT
Start: 2025-03-21

## 2025-03-21 RX ORDER — LIDOCAINE HYDROCHLORIDE 20 MG/ML
JELLY TOPICAL PRN
OUTPATIENT
Start: 2025-03-21

## 2025-03-21 RX ORDER — NEOMYCIN/BACITRACIN/POLYMYXINB 3.5-400-5K
OINTMENT (GRAM) TOPICAL PRN
OUTPATIENT
Start: 2025-03-21

## 2025-03-21 RX ORDER — LIDOCAINE HYDROCHLORIDE 40 MG/ML
SOLUTION TOPICAL PRN
OUTPATIENT
Start: 2025-03-21

## 2025-03-21 RX ORDER — BACITRACIN ZINC AND POLYMYXIN B SULFATE 500; 1000 [USP'U]/G; [USP'U]/G
OINTMENT TOPICAL PRN
OUTPATIENT
Start: 2025-03-21

## 2025-03-21 RX ORDER — CLOBETASOL PROPIONATE 0.5 MG/G
OINTMENT TOPICAL PRN
OUTPATIENT
Start: 2025-03-21

## 2025-03-21 RX ORDER — LIDOCAINE 40 MG/G
CREAM TOPICAL PRN
OUTPATIENT
Start: 2025-03-21

## 2025-03-21 RX ORDER — GENTAMICIN SULFATE 1 MG/G
OINTMENT TOPICAL PRN
OUTPATIENT
Start: 2025-03-21

## 2025-03-21 RX ORDER — BETAMETHASONE DIPROPIONATE 0.5 MG/G
CREAM TOPICAL PRN
OUTPATIENT
Start: 2025-03-21

## 2025-03-21 RX ORDER — LIDOCAINE 50 MG/G
OINTMENT TOPICAL PRN
OUTPATIENT
Start: 2025-03-21

## 2025-03-21 RX ORDER — SILVER SULFADIAZINE 10 MG/G
CREAM TOPICAL PRN
OUTPATIENT
Start: 2025-03-21

## 2025-03-21 ASSESSMENT — PAIN SCALES - GENERAL: PAINLEVEL_OUTOF10: 10

## 2025-03-21 ASSESSMENT — PAIN DESCRIPTION - LOCATION: LOCATION: BUTTOCKS;BACK

## 2025-03-21 NOTE — WOUND CARE
Discharge Instructions for  [facility]  [address] [place] [address]  [address]  [place] [address]  Phone [phone]   Fax [phone]      NAME:  Venita Calvillo  YOB: 1955  MEDICAL RECORD NUMBER:  [id number]  DATE:  3/21/2025    Return Appointment:   1 week with Tu Auguste DO      Instructions: Buttock and Gluteal cleft wounds   May wash with soap and water  Apply VASHE for 1 min   Apply Vaseline 1-2x day     Augmentin - antibiotic - will be sent to your pharmacy.   and start as soon as possible.       Should you experience increased redness, swelling, pain, foul odor, size of wound(s), or have a temperature over 101 degrees please contact the Wound [facility]aling Center at [phone] or if after hours contact your primary care physician or go to the hospital emergency department.    PLEASE NOTE: IF YOU ARE UNABLE TO OBTAIN WOUND SUPPLIES, CONTINUE TO USE THE SUPPLIES YOU HAVE AVAILABLE UNTIL YOU ARE ABLE TO REACH US. IT IS MOST IMPORTANT TO KEEP THE WOUND COVERED AT ALL TIMES.    Electronically signed JONATHAN WONG RN on 3/21/2025 at 9:03 AM

## 2025-03-21 NOTE — FLOWSHEET NOTE
03/21/25 0825   Wound 03/21/25 Perineum #1 Gluteal cleft   Date First Assessed/Time First Assessed: 03/21/25 0839   Location: Perineum  Wound Description (Comments): #1 Gluteal cleft   Wound Image    Wound Etiology Other   Wound Cleansed Vashe   Wound Length (cm) 1 cm   Wound Width (cm) 0.1 cm   Wound Depth (cm) 0.1 cm   Wound Surface Area (cm^2) 0.1 cm^2   Wound Volume (cm^3) 0.01 cm^3   Wound Assessment Pink/red   Drainage Amount Small (< 25%)   Drainage Description Serosanguinous   Odor None   Alley-wound Assessment Maceration   Wound Thickness Description not for Pressure Injury Full thickness   Wound 03/21/25 Buttocks #2 left buttock   Date First Assessed/Time First Assessed: 03/21/25 0840   Wound Approximate Age at First Assessment (Weeks): 2 weeks  Location: Buttocks  Wound Description (Comments): #2 left buttock   Wound Image    Wound Etiology Other   Wound Cleansed Vashe   Wound Length (cm) 0.1 cm   Wound Width (cm) 0.4 cm   Wound Depth (cm) 0.3 cm   Wound Surface Area (cm^2) 0.04 cm^2   Wound Volume (cm^3) 0.012 cm^3   Wound Assessment Pink/red   Drainage Amount Small (< 25%)   Drainage Description Other (Comment)  (tan)   Odor None   Alley-wound Assessment Induration   Margins Defined edges   Wound Thickness Description not for Pressure Injury Full thickness   Pain Assessment   Pain Assessment 0-10   Pain Level 10   Pain Location Buttocks;Back

## 2025-03-21 NOTE — DISCHARGE INSTRUCTIONS
Instructions: Buttock and Gluteal cleft wounds   May wash with soap and water  Apply VASHE for 1 min   Apply Vaseline 1-2x day      Augmentin - antibiotic - will be sent to your pharmacy.   and start as soon as possible.         Should you experience increased redness, swelling, pain, foul odor, size of wound(s), or have a temperature over 101 degrees please contact the [facility] at [phone] or if after hours contact your primary care physician or go to the hospital emergency department.     PLEASE NOTE: IF YOU ARE UNABLE TO OBTAIN WOUND SUPPLIES, CONTINUE TO USE THE SUPPLIES YOU HAVE AVAILABLE UNTIL YOU ARE ABLE TO REACH US. IT IS MOST IMPORTANT TO KEEP THE WOUND COVERED AT ALL TIMES.

## 2025-03-21 NOTE — PROGRESS NOTES
[facility]   [facility]   Medical Staff Progress Note     Venita Calvillo  MEDICAL RECORD NUMBER:  [id number]  AGE: 70 y.o.   GENDER: female  : 1955  EPISODE DATE:  3/21/2025    Chief complaint and reason for visit:     Chief Complaint   Patient presents with    Wound Check     Buttock wounds       Patient presenting for follow up evaluation of wound(s) per chief complaint.  Patient comes in with a draining area on her left buttocks.  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  This area on her left buttocks is started draining just a few days ago.    Subjective and ROS: Symptoms, wound related issues, or other pertinent wound history since last visit: no new wound care issues. Tolerating current treatment. No systemic complaints above baseline.    History of Wound Context: Per original history and physical on this patient. Changes in history since last evaluation: none    Medical Decision Making:     Problem List Items Addressed This Visit          Other    * (Principal) Left buttock abscess - Primary (Chronic)       Wounds and Treatment Plan:  Small left buttocks abscess is noted.  This area is open and draining.  Able to probe about 1.5 cm.  No significant erythema.  At this point we will utilize Augmentin.  Utilize a dressing.  Vaseline on the rest of the previous wound site in the gluteal cleft area.  Patient will follow-up in 1 week    Return Appointment:   1 week with Tu Auguste DO        Instructions: Buttock and Gluteal cleft wounds   May wash with soap and water  Apply VASHE for 1 min   Apply Vaseline 1-2x day      Augmentin - antibiotic - will be sent to your pharmacy.   and start as soon as possible.         Other associated diagnoses or problems addressed:  Recent buttock surgery doing well    Pertinent imaging reviewed including independent interpretation

## 2025-03-31 ENCOUNTER — HOSPITAL ENCOUNTER (OUTPATIENT)
Dept: WOUND CARE | Age: 70
Discharge: HOME | End: 2025-03-31

## 2025-03-31 VITALS
SYSTOLIC BLOOD PRESSURE: 181 MMHG | TEMPERATURE: 97.8 F | WEIGHT: 126 LBS | BODY MASS INDEX: 19.78 KG/M2 | RESPIRATION RATE: 16 BRPM | DIASTOLIC BLOOD PRESSURE: 76 MMHG | HEIGHT: 67 IN | HEART RATE: 66 BPM

## 2025-03-31 DIAGNOSIS — L02.31 LEFT BUTTOCK ABSCESS: Primary | Chronic | ICD-10-CM

## 2025-03-31 PROCEDURE — 99213 OFFICE O/P EST LOW 20 MIN: CPT | Performed by: FAMILY MEDICINE

## 2025-03-31 PROCEDURE — 99213 OFFICE O/P EST LOW 20 MIN: CPT

## 2025-03-31 ASSESSMENT — PAIN SCALES - GENERAL: PAINLEVEL_OUTOF10: 8

## 2025-03-31 ASSESSMENT — PAIN DESCRIPTION - LOCATION: LOCATION: BUTTOCKS

## 2025-03-31 NOTE — WOUND CARE
Discharge Instructions for  [facility]  [address] [place] [address]  [address] [place] [address]  Phone [phone]   Fax [phone]      NAME:  Venita Calvillo  YOB: 1955  MEDICAL RECORD NUMBER:  [id number]  DATE:  3/31/2025    Return Appointment:   2 weeks with Tu Auguste DO      Instructions: Buttock and Gluteal cleft wounds   May wash with soap and water  Apply xeroform to wounds.   Cover with gauze.  Change daily or after BM.    Continue augmentin until completed      Should you experience increased redness, swelling, pain, foul odor, size of wound(s), or have a temperature over 101 degrees please contact the [facility] at [phone] or if after hours contact your primary care physician or go to the hospital emergency department.    PLEASE NOTE: IF YOU ARE UNABLE TO OBTAIN WOUND SUPPLIES, CONTINUE TO USE THE SUPPLIES YOU HAVE AVAILABLE UNTIL YOU ARE ABLE TO REACH US. IT IS MOST IMPORTANT TO KEEP THE WOUND COVERED AT ALL TIMES.    Electronically signed Zainab Morales RN on 3/31/2025 at 8:16 AM

## 2025-03-31 NOTE — FLOWSHEET NOTE
03/31/25 0809   Wound 03/21/25 Perineum #1 Gluteal cleft   Date First Assessed/Time First Assessed: 03/21/25 0839   Location: Perineum  Wound Description (Comments): #1 Gluteal cleft   Wound Image    Wound Etiology Other   Dressing Status Intact   Wound Cleansed Vashe   Dressing/Treatment Xeroform   Wound Length (cm) 9.5 cm   Wound Width (cm) 0.1 cm   Wound Depth (cm) 0.1 cm   Wound Surface Area (cm^2) 0.95 cm^2   Change in Wound Size % (l*w) -850   Wound Volume (cm^3) 0.095 cm^3   Wound Healing % -850   Wound Assessment Pink/red   Drainage Amount Small (< 25%)   Drainage Description Serosanguinous   Odor None   Alley-wound Assessment Maceration   Wound Thickness Description not for Pressure Injury Full thickness   Wound 03/21/25 Buttocks #2 left buttock   Date First Assessed/Time First Assessed: 03/21/25 0840   Wound Approximate Age at First Assessment (Weeks): 2 weeks  Location: Buttocks  Wound Description (Comments): #2 left buttock   Wound Image    Wound Etiology Other   Dressing/Treatment Open to air   Wound Length (cm) 0 cm   Wound Width (cm) 0 cm   Wound Depth (cm) 0 cm   Wound Surface Area (cm^2) 0 cm^2   Change in Wound Size % (l*w) 100   Wound Volume (cm^3) 0 cm^3   Wound Healing % 100   Wound Assessment Epithelialization   Drainage Amount None (dry)   Odor None   Alley-wound Assessment Induration

## 2025-03-31 NOTE — PROGRESS NOTES
[facility]   [facility] and [facility]   Medical Staff Progress Note     Venita Calvillo  MEDICAL RECORD NUMBER:  [id number]  AGE: 70 y.o.   GENDER: female  : 1955  EPISODE DATE:  3/31/2025    Chief complaint and reason for visit:     Chief Complaint   Patient presents with    Wound Check     Midline buttocks wounds      Patient presenting for follow up evaluation of wound(s) per chief complaint.  Gluteal fold cleft has 2 small open areas but the left buttock abscess area seems to have resolved.  Patient finishing antibiotics.    Subjective and ROS: Symptoms, wound related issues, or other pertinent wound history since last visit: no new wound care issues. Tolerating current treatment. No systemic complaints above baseline.    History of Wound Context: Per original history and physical on this patient. Changes in history since last evaluation: none    Medical Decision Making:     Problem List Items Addressed This Visit          Other    * (Principal) Left buttock abscess - Primary (Chronic)       Wounds and Treatment Plan:    Patient will finish antibiotics for abscess.  Gluteal cleft fold area has several small openings and will try Xeroform on this over the next 2 weeks.  Return Appointment:   2 weeks with Tu Auguste DO        Instructions: Buttock and Gluteal cleft wounds   May wash with soap and water  Apply xeroform to wounds.   Cover with gauze.  Change daily or after BM.     Continue augmentin until completed     Other associated diagnoses or problems addressed:  Recent buttock abscess left    Pertinent imaging reviewed including independent interpretation include:  None    Pertinent labs reviewed.   Medical records and review of external note (s) from other providers done as well.    New lab or imaging orders placed:  None     Prescription drug management: N/A     Discussion of management or test interpretation with N/A.    Comorbid conditions affecting

## (undated) DEVICE — ELECTRODE NDL 2.8IN COAT VALLEYLAB

## (undated) DEVICE — DRAIN SURG 3/4 W10MMXL20CM 100% SIL PERF FLAT HUBLESS

## (undated) DEVICE — DRESSING,GAUZE,XEROFORM,CURAD,5"X9",ST: Brand: CURAD

## (undated) DEVICE — SOLUTION IRRIGATION NACL 0.9% 1000 ML FLX CONTAINER

## (undated) DEVICE — SUTURE ABSORBABLE MONOFILAMENT 0 CT-1 36 MM DYED SPRL PDS + SXPP1B450

## (undated) DEVICE — STAPLER SKIN SQ 30 ABSRB STPL DISP INSORB ORDER VIA PHONE OR EMAIL

## (undated) DEVICE — FOR ASEPTIC DECANTING OF I.V. FLUIDS FROM FLEXIBLE CONTAINERS.: Brand: BAG DECANTER

## (undated) DEVICE — SOLUTION IRRIG 1000ML 0.9% SOD CHL USP POUR PLAS BTL

## (undated) DEVICE — MICRODISSECTION NEEDLE STRAIGHT SLEEVE: Brand: COLORADO

## (undated) DEVICE — ELECTRODE PT RET AD L9FT HI MOIST COND ADH HYDRGEL CORDED

## (undated) DEVICE — BANDAGE COMPR XL KNEE ELBW TAN TUBIGRIP ARTHRO-PAD LTX

## (undated) DEVICE — SUTURE MONOCRYL ABSRB MFIL VLT CT NO 0 27IN  Y334H

## (undated) DEVICE — SUTURE VICRYL + 3-0 L27IN ABSRB UD PS-2 L19MM 3/8 CIR PRIM VCP427H

## (undated) DEVICE — GLOVE SURG SZ 7 CRM LTX FREE POLYISOPRENE POLYMER BEAD ANTI

## (undated) DEVICE — SUTURE PROL SZ 3-0 L18IN NONABSORBABLE BLU L19MM PS-2 3/8 8687H

## (undated) DEVICE — SKIN PREP TRAY 4 COMPARTM TRAY: Brand: MEDLINE INDUSTRIES, INC.

## (undated) DEVICE — RESERVOIR,SUCTION,100CC,SILICONE: Brand: MEDLINE

## (undated) DEVICE — SHEET, DRAPE, SPLIT, STERILE: Brand: MEDLINE

## (undated) DEVICE — SPONGE GZ W4XL4IN COT 12 PLY TYP VII WVN C FLD DSGN STERILE

## (undated) DEVICE — NEEDLE SPNL L3.5IN PNK HUB S STL REG WALL FIT STYL W/ QNCKE

## (undated) DEVICE — DRAPE,TOP,102X53,STERILE: Brand: MEDLINE